# Patient Record
Sex: FEMALE | Race: WHITE | NOT HISPANIC OR LATINO | Employment: FULL TIME | URBAN - METROPOLITAN AREA
[De-identification: names, ages, dates, MRNs, and addresses within clinical notes are randomized per-mention and may not be internally consistent; named-entity substitution may affect disease eponyms.]

---

## 2018-01-09 ENCOUNTER — HISTORICAL (OUTPATIENT)
Dept: SURGERY | Facility: HOSPITAL | Age: 53
End: 2018-01-09

## 2018-01-09 LAB
ABS NEUT (OLG): 5.58 X10(3)/MCL (ref 2.1–9.2)
BASOPHILS # BLD AUTO: 0.02 X10(3)/MCL (ref 0–0.2)
BASOPHILS NFR BLD AUTO: 0.3 % (ref 0–1)
EOSINOPHIL # BLD AUTO: 0.13 X10(3)/MCL (ref 0–0.9)
EOSINOPHIL NFR BLD AUTO: 1.6 % (ref 0–6.4)
ERYTHROCYTE [DISTWIDTH] IN BLOOD BY AUTOMATED COUNT: 15 % (ref 11.5–17)
HCT VFR BLD AUTO: 40.5 % (ref 37–47)
HGB BLD-MCNC: 13.2 GM/DL (ref 12–16)
IMM GRANULOCYTES # BLD AUTO: 0.02 10*3/UL (ref 0–0.02)
IMM GRANULOCYTES NFR BLD AUTO: 0.3 % (ref 0–0.43)
LYMPHOCYTES # BLD AUTO: 1.58 X10(3)/MCL (ref 0.6–4.6)
LYMPHOCYTES NFR BLD AUTO: 20.1 % (ref 16–44)
MCH RBC QN AUTO: 27 PG (ref 27–31)
MCHC RBC AUTO-ENTMCNC: 32.6 GM/DL (ref 33–36)
MCV RBC AUTO: 83 FL (ref 80–94)
MONOCYTES # BLD AUTO: 0.55 X10(3)/MCL (ref 0.1–1.3)
MONOCYTES NFR BLD AUTO: 7 % (ref 4–12.1)
NEUTROPHILS # BLD AUTO: 5.58 X10(3)/MCL (ref 2.1–9.2)
NEUTROPHILS NFR BLD AUTO: 70.7 % (ref 43–73)
NRBC BLD AUTO-RTO: 0 % (ref 0–0.2)
PLATELET # BLD AUTO: 154 X10(3)/MCL (ref 130–400)
PMV BLD AUTO: 10.9 FL (ref 7.4–10.4)
RBC # BLD AUTO: 4.88 X10(6)/MCL (ref 4.2–5.4)
WBC # SPEC AUTO: 7.9 X10(3)/MCL (ref 4.5–11.5)

## 2021-02-19 PROBLEM — E66.9 CLASS 1 OBESITY IN ADULT: Status: ACTIVE | Noted: 2021-02-19

## 2021-02-19 PROBLEM — M54.12 CERVICAL RADICULOPATHY: Status: ACTIVE | Noted: 2021-02-19

## 2021-03-19 PROBLEM — E55.9 VITAMIN D DEFICIENCY: Status: ACTIVE | Noted: 2021-03-19

## 2021-03-19 PROBLEM — I10 HYPERTENSION: Status: ACTIVE | Noted: 2021-03-19

## 2021-03-19 PROBLEM — E63.0 ESSENTIAL FATTY ACID (EFA) DEFICIENCY: Status: ACTIVE | Noted: 2021-03-19

## 2021-03-19 PROBLEM — K75.81 NASH (NONALCOHOLIC STEATOHEPATITIS): Status: ACTIVE | Noted: 2021-03-19

## 2021-03-19 PROBLEM — R73.01 IMPAIRED FASTING GLUCOSE: Status: ACTIVE | Noted: 2021-03-19

## 2021-03-19 PROBLEM — E78.5 HYPERLIPIDEMIA: Status: ACTIVE | Noted: 2021-03-19

## 2021-09-03 PROBLEM — R13.10 DYSPHAGIA: Status: ACTIVE | Noted: 2021-09-03

## 2021-12-01 PROBLEM — Z00.00 WELLNESS EXAMINATION: Status: ACTIVE | Noted: 2021-12-01

## 2021-12-01 PROBLEM — M10.9 GOUT OF LEFT FOOT: Status: ACTIVE | Noted: 2021-12-01

## 2021-12-01 PROBLEM — Z00.00 WELLNESS EXAMINATION: Status: RESOLVED | Noted: 2021-12-01 | Resolved: 2021-12-01

## 2022-01-26 PROBLEM — Z01.818 PREOP EXAMINATION: Status: ACTIVE | Noted: 2022-01-26

## 2022-01-26 NOTE — H&P (VIEW-ONLY)
Clinic Note  1/26/2022      Subjective:       Patient ID:  Tamra is a 56 y.o. female being seen for a clinic visit.      Chief Complaint: Follow-up and Pre-op Exam    Patient presents to the clinic today for follow up/review and surgery clearance.   Patient scheduled for right spur sx with Dr Khalil on Feb 18, 2022      Family History   Problem Relation Age of Onset    Coronary artery disease Mother     Hyperlipidemia Mother     Dementia Mother     Hypertension Mother     Hyperlipidemia Father     Basal cell carcinoma Father     Acute myelogenous leukemia Father     Depression Father     Diabetes Father     Hypertension Father     Sleep apnea Father     Heart attack Father      Social History     Tobacco Use    Smoking status: Never Smoker    Smokeless tobacco: Never Used   Substance and Sexual Activity    Alcohol use: Never    Drug use: Never     Past Surgical History:   Procedure Laterality Date    ACHILLES TENDON SURGERY  01/09/2018 & 11/7/2017    repair torn/ detached to left foot    COLONOSCOPY  2014    CYSTOSCOPY  2015    ESOPHAGOGASTRODUODENOSCOPY  08/2015    LAPAROSCOPIC GASTRIC BANDING  09/2015    PARTIAL PENECTOMY  12/2015    RADICAL HYSTERECTOMY  07/2015    TRIGGER FINGER RELEASE  08/2015     Medication List with Changes/Refills   Current Medications    ALPRAZOLAM (XANAX) 0.25 MG TABLET    Take 1 tablet (0.25 mg total) by mouth nightly as needed for Anxiety.    HYDROCHLOROTHIAZIDE (MICROZIDE) 12.5 MG CAPSULE    Take 12.5 mg by mouth once daily.   Discontinued Medications    COLCHICINE (COLCRYS) 0.6 MG TABLET    Take 1 tablet (0.6 mg total) by mouth once daily.    INDOMETHACIN (INDOCIN) 25 MG CAPSULE    Take 1 capsule (25 mg total) by mouth 3 (three) times daily.     Patient Active Problem List   Diagnosis    Cervical radiculopathy    Class 1 obesity in adult    Essential fatty acid (EFA) deficiency    Hypertension    Hyperlipidemia    Vitamin D deficiency    RUIZ  "(nonalcoholic steatohepatitis)    Impaired fasting glucose    Dysphagia    Gout of left foot    Preop examination     Review of Systems   Constitutional: Negative for chills and fever.   HENT: Negative for ear pain and sore throat.    Eyes: Negative for blurred vision and double vision.   Respiratory: Negative for cough and wheezing.    Cardiovascular: Negative for chest pain and palpitations.   Gastrointestinal: Negative for abdominal pain, constipation and diarrhea.   Genitourinary: Negative for dysuria.   Musculoskeletal: Positive for joint pain. Negative for myalgias.   Skin: Negative for rash.   Neurological: Negative for weakness and headaches.   Endo/Heme/Allergies: Does not bruise/bleed easily.   Psychiatric/Behavioral: Negative for depression and suicidal ideas.   All other systems reviewed and are negative.        Objective:      /82   Pulse 73   Resp 18   Ht 5' 6" (1.676 m)   Wt 93.9 kg (207 lb)   SpO2 98%   BMI 33.41 kg/m²   Estimated body mass index is 33.41 kg/m² as calculated from the following:    Height as of this encounter: 5' 6" (1.676 m).    Weight as of this encounter: 93.9 kg (207 lb).     Physical Exam  Vitals reviewed.   Constitutional:       Appearance: Normal appearance. She is obese.   HENT:      Head: Normocephalic and atraumatic.      Nose: Nose normal.      Mouth/Throat:      Pharynx: Oropharynx is clear.   Eyes:      Extraocular Movements: Extraocular movements intact.      Conjunctiva/sclera: Conjunctivae normal.      Pupils: Pupils are equal, round, and reactive to light.   Cardiovascular:      Rate and Rhythm: Normal rate and regular rhythm.      Pulses: Normal pulses.   Pulmonary:      Effort: Pulmonary effort is normal.      Breath sounds: Normal breath sounds.   Abdominal:      General: Abdomen is flat. Bowel sounds are normal.      Palpations: Abdomen is soft.   Musculoskeletal:         General: Normal range of motion.      Cervical back: Normal range of motion " and neck supple.   Skin:     General: Skin is warm and dry.   Neurological:      General: No focal deficit present.      Mental Status: She is alert and oriented to person, place, and time. Mental status is at baseline.   Psychiatric:         Mood and Affect: Mood normal.         Behavior: Behavior normal.            Assessment and Plan:           Problem List Items Addressed This Visit        Cardiac/Vascular    Hypertension    Current Assessment & Plan         BP Readings from Last 3 Encounters:   01/26/22 128/82   12/01/21 130/78   09/03/21 126/74     Likely secondary to indomethacin use will discontinue nonsteroidal anti-inflammatory drugs then reevaluate. Will start HCTZ 12.5 mg po q am,          Hyperlipidemia    Current Assessment & Plan      as of 9/11/2020,  as of 3/2021.   Lab Results   Component Value Date    CHOL 265 (H) 11/10/2021     Lab Results   Component Value Date    HDL 52 11/10/2021     Lab Results   Component Value Date    LDLCALC 185 (H) 11/10/2021     Lab Results   Component Value Date    TRIG 152 (H) 11/10/2021     No results found for: CHOLHDL  Recommend lifestyle modifications with diet and exercise.             Endocrine    Class 1 obesity in adult    Current Assessment & Plan     Obesity: Stay Active. As tolerated alternate resistance training with stretching and cardio. Goal of 150 minutes per week or 7,500 - 10,000 steps per day. Follow the Mediterranean Diet. Include fruit, vegetables, olive oil, seeds, nuts, whole grains, cold water fish and lean cuts of meat.  Do not drink beverages with any caloric content.  Decrease portion sizes slightly which will result in an approximately 500-calorie deficit.  Consider substituting one meal a day with a meal replacement such as Slim fast, lean cuisine, or weight watcher's.  Avoid fast or fried food.    Obesity: lap band in 2008.  75 pound weight loss total.  Failed adipex in past (palpitations).  Failed Qsymia (didnt feel right).   Failed Belviq due to headache and fatigue. Will consider contrave in future. began Contrave 11/18/2020. but stopped suddenly due to side effects (generalized malaise/fatigue)    12/1/2021  Wt Readings from Last 3 Encounters:   01/26/22 1114 93.9 kg (207 lb)   12/01/21 0810 97.1 kg (214 lb)   09/03/21 0904 98.9 kg (218 lb)              Impaired fasting glucose    Current Assessment & Plan     Lab Results   Component Value Date    HGBA1C 5.4 11/10/2021   FBS of 112. Continue weight reduction and dietary modifications.               GI    RUIZ (nonalcoholic steatohepatitis)    Current Assessment & Plan     Nonalcoholic steatohepatitis: Recommend a 5-10% body weight reduction.  Advised strict limitation of saturated fat and trans-fat consumption.  Recommend daily exercise for 30 minutes daily.  Recommend a very low calorie diet of 500 jason per day until inflammation resolves.    Lab Results   Component Value Date    ALT 74 (H) 11/10/2021    AST 47 (H) 11/10/2021    BILITOT 0.5 11/10/2021                 Orthopedic    Gout of left foot    Current Assessment & Plan     12/1/2021 Left foot, reports telemed visit on Fri 11/26, taking colchicine w/ some improvement. Patient was only provided 2 days of colchicine.   Will send in colchicine 0.6mg daily until pain resolved and indomethacin.             Other    Preop examination - Primary    Current Assessment & Plan     Perioperative cardiovascular risk assessment:  Patient is low cardiovascular risk.  Procedure: Right heel achilles tendon detachment/debridement.   Procedure risk: Low   Requested labs will be ordered if necessary.    Medication recommendations as follows:  - hold all medications in perioperative setting.     May proceed to surgery without further risk stratification.                 Relevant Orders    Basic metabolic panel    CBC W/ AUTO DIFFERENTIAL    Protime-INR    APTT    Uric acid          Follow up:   Follow up in about 6 months (around 7/26/2022), or  Labs:  follow up with A1c, urine microalbumin, TSH/Free T4 (5 months) Imaging: [facility] (6 months), for lab review .     Other Orders Placed This Visit:  Orders Placed This Encounter   Procedures    Basic metabolic panel     Standing Status:   Future     Standing Expiration Date:   3/27/2023    CBC W/ AUTO DIFFERENTIAL     Standing Status:   Future     Standing Expiration Date:   3/27/2023    Protime-INR     Standing Status:   Future     Standing Expiration Date:   3/27/2023    APTT     Standing Status:   Future     Standing Expiration Date:   3/27/2023    Uric acid     Standing Status:   Future     Standing Expiration Date:   3/27/2023           Rosalia Payne

## 2022-02-07 ENCOUNTER — LAB VISIT (OUTPATIENT)
Dept: LAB | Facility: HOSPITAL | Age: 57
End: 2022-02-07
Attending: INTERNAL MEDICINE
Payer: COMMERCIAL

## 2022-02-07 DIAGNOSIS — Z01.818 PREOP EXAMINATION: ICD-10-CM

## 2022-02-07 LAB
ANION GAP SERPL CALC-SCNC: 3 MMOL/L (ref 8–16)
APTT BLDCRRT: 24.4 SEC (ref 21–32)
BASOPHILS # BLD AUTO: 0.03 K/UL (ref 0–0.2)
BASOPHILS NFR BLD: 0.5 % (ref 0–1.9)
BUN SERPL-MCNC: 20 MG/DL (ref 6–20)
CALCIUM SERPL-MCNC: 9.1 MG/DL (ref 8.7–10.5)
CHLORIDE SERPL-SCNC: 112 MMOL/L (ref 95–110)
CO2 SERPL-SCNC: 28 MMOL/L (ref 23–29)
CREAT SERPL-MCNC: 1 MG/DL (ref 0.5–1.4)
DIFFERENTIAL METHOD: NORMAL
EOSINOPHIL # BLD AUTO: 0.2 K/UL (ref 0–0.5)
EOSINOPHIL NFR BLD: 3.9 % (ref 0–8)
ERYTHROCYTE [DISTWIDTH] IN BLOOD BY AUTOMATED COUNT: 13.1 % (ref 11.5–14.5)
EST. GFR  (AFRICAN AMERICAN): >60 ML/MIN/1.73 M^2
EST. GFR  (NON AFRICAN AMERICAN): >60 ML/MIN/1.73 M^2
GLUCOSE SERPL-MCNC: 97 MG/DL (ref 70–110)
HCT VFR BLD AUTO: 42.1 % (ref 37–48.5)
HGB BLD-MCNC: 14 G/DL (ref 12–16)
IMM GRANULOCYTES # BLD AUTO: 0.03 K/UL (ref 0–0.04)
IMM GRANULOCYTES NFR BLD AUTO: 0.5 % (ref 0–0.5)
INR PPP: 1 (ref 0.8–1.2)
LYMPHOCYTES # BLD AUTO: 1.9 K/UL (ref 1–4.8)
LYMPHOCYTES NFR BLD: 30.8 % (ref 18–48)
MCH RBC QN AUTO: 28.2 PG (ref 27–31)
MCHC RBC AUTO-ENTMCNC: 33.3 G/DL (ref 32–36)
MCV RBC AUTO: 85 FL (ref 82–98)
MONOCYTES # BLD AUTO: 0.5 K/UL (ref 0.3–1)
MONOCYTES NFR BLD: 7.8 % (ref 4–15)
NEUTROPHILS # BLD AUTO: 3.5 K/UL (ref 1.8–7.7)
NEUTROPHILS NFR BLD: 56.5 % (ref 38–73)
NRBC BLD-RTO: 0 /100 WBC
PLATELET # BLD AUTO: 167 K/UL (ref 150–450)
PMV BLD AUTO: 10.8 FL (ref 9.2–12.9)
POTASSIUM SERPL-SCNC: 4.7 MMOL/L (ref 3.5–5.1)
PROTHROMBIN TIME: 10.6 SEC (ref 9–12.5)
RBC # BLD AUTO: 4.97 M/UL (ref 4–5.4)
SODIUM SERPL-SCNC: 143 MMOL/L (ref 136–145)
URATE SERPL-MCNC: 8 MG/DL (ref 2.4–5.7)
WBC # BLD AUTO: 6.16 K/UL (ref 3.9–12.7)

## 2022-02-07 PROCEDURE — 85610 PROTHROMBIN TIME: CPT | Performed by: FAMILY MEDICINE

## 2022-02-07 PROCEDURE — 84550 ASSAY OF BLOOD/URIC ACID: CPT | Performed by: FAMILY MEDICINE

## 2022-02-07 PROCEDURE — 85025 COMPLETE CBC W/AUTO DIFF WBC: CPT | Performed by: FAMILY MEDICINE

## 2022-02-07 PROCEDURE — 80048 BASIC METABOLIC PNL TOTAL CA: CPT | Performed by: FAMILY MEDICINE

## 2022-02-07 PROCEDURE — 36415 COLL VENOUS BLD VENIPUNCTURE: CPT | Performed by: FAMILY MEDICINE

## 2022-02-07 PROCEDURE — 85730 THROMBOPLASTIN TIME PARTIAL: CPT | Performed by: FAMILY MEDICINE

## 2022-02-09 NOTE — DISCHARGE INSTRUCTIONS
BEFORE THE PROCEDURE:    REPORT ANY CHANGE IN YOUR PHYSICAL CONDITION TO YOUR DOCTOR IMMEDIATELY.  SELF ISOLATE AND CHECK TEMPERATURE DAILY, IF TEMP OVER 100, CALL PHYSICIAN IMMEDIATELY.  TRY TO REFRAIN FROM SMOKING AND ALCOHOL 72 HOURS BEFORE YOUR PROCEDURE.   DO NOT EAT OR DRINK ANYTHING AFTER MIDNIGHT THE NIGHT BEFORE YOUR PROCEDURE.  NO MAKE UP, NAIL POLISH OR JEWELRY.      SURGERY PREP    SOMEONE WILL CALL YOU THE DAY BEFORE YOUR PROCEDURE WITH A CHECK-IN TIME FOR YOUR PROCEDURE.    DAY OF YOUR PROCEDURE:    TAKE BLOOD PRESSURE MEDICATIONS THE MORNING OF YOUR PROCEDURE, WITH SMALL SIPS WATER, AS DIRECTED BY YOUR PHYSICIAN.   DO NOT TAKE ANY DIABETIC MEDICATIONS UNLESS DIRECTED TO DO SO BY YOUR PHYSICIAN.   CONTACT LENSES AND DENTURES MUST BE REMOVED.  A RESPONSIBLE ADULT MUST ACCOMPANY YOU HOME UPON DISCHARGE.   ONLY 1 VISITOR ALLOWED PER ROOM.     COVID TESTING Thursday February 17, 2022    YOUR THOUGHTS AND OPINIONS HELP US TO BETTER SERVE YOU.     PLEASE PARTICIPATE IN SURVEYS ABOUT YOUR CARE.    THANK YOU FOR CHOOSING [facility].

## 2022-02-10 ENCOUNTER — HOSPITAL ENCOUNTER (OUTPATIENT)
Dept: PREADMISSION TESTING | Facility: HOSPITAL | Age: 57
Discharge: HOME | End: 2022-02-10
Attending: PODIATRIST
Payer: COMMERCIAL

## 2022-02-10 NOTE — DISCHARGE INSTRUCTIONS
BEFORE THE PROCEDURE:    REPORT ANY CHANGE IN YOUR PHYSICAL CONDITION TO YOUR DOCTOR IMMEDIATELY.  SELF ISOLATE AND CHECK TEMPERATURE DAILY, IF TEMP OVER 100, CALL PHYSICIAN IMMEDIATELY.  TRY TO REFRAIN FROM SMOKING AND ALCOHOL 72 HOURS BEFORE YOUR PROCEDURE.   DO NOT EAT OR DRINK ANYTHING AFTER MIDNIGHT THE NIGHT BEFORE YOUR PROCEDURE.  NO MAKE UP, NAIL POLISH OR JEWELRY.      SURGERY PREP    SOMEONE WILL CALL YOU THE DAY BEFORE YOUR PROCEDURE WITH A CHECK-IN TIME FOR YOUR PROCEDURE.    DAY OF YOUR PROCEDURE:    TAKE BLOOD PRESSURE MEDICATIONS THE MORNING OF YOUR PROCEDURE, WITH SMALL SIPS WATER, AS DIRECTED BY YOUR PHYSICIAN.   DO NOT TAKE ANY DIABETIC MEDICATIONS UNLESS DIRECTED TO DO SO BY YOUR PHYSICIAN.   CONTACT LENSES AND DENTURES MUST BE REMOVED.  A RESPONSIBLE ADULT MUST ACCOMPANY YOU HOME UPON DISCHARGE.   ONLY 1 VISITOR ALLOWED PER ROOM.     COVID TESTING Thursday February 17, 2022  (8-12)    YOUR THOUGHTS AND OPINIONS HELP US TO BETTER SERVE YOU.     PLEASE PARTICIPATE IN SURVEYS ABOUT YOUR CARE.    THANK YOU FOR CHOOSING [facility].

## 2022-02-14 ENCOUNTER — HOSPITAL ENCOUNTER (OUTPATIENT)
Dept: PULMONOLOGY | Facility: HOSPITAL | Age: 57
Discharge: HOME | End: 2022-02-14
Attending: PODIATRIST
Payer: COMMERCIAL

## 2022-02-14 ENCOUNTER — HOSPITAL ENCOUNTER (OUTPATIENT)
Dept: RADIOLOGY | Facility: HOSPITAL | Age: 57
Discharge: HOME | End: 2022-02-14
Attending: PODIATRIST
Payer: COMMERCIAL

## 2022-02-14 ENCOUNTER — HOSPITAL ENCOUNTER (OUTPATIENT)
Dept: PREADMISSION TESTING | Facility: HOSPITAL | Age: 57
Discharge: HOME | End: 2022-02-14
Attending: PODIATRIST
Payer: COMMERCIAL

## 2022-02-14 VITALS — BODY MASS INDEX: 32.78 KG/M2 | WEIGHT: 204 LBS | HEIGHT: 66 IN

## 2022-02-14 DIAGNOSIS — Z01.818 PRE-OP EVALUATION: ICD-10-CM

## 2022-02-14 DIAGNOSIS — Z01.818 PRE-OP EVALUATION: Primary | ICD-10-CM

## 2022-02-14 DIAGNOSIS — Z01.818 PRE-OP TESTING: ICD-10-CM

## 2022-02-14 PROCEDURE — 93010 ELECTROCARDIOGRAM REPORT: CPT | Mod: ,,, | Performed by: INTERNAL MEDICINE

## 2022-02-14 PROCEDURE — 93005 ELECTROCARDIOGRAM TRACING: CPT

## 2022-02-14 PROCEDURE — 71046 X-RAY EXAM CHEST 2 VIEWS: CPT | Mod: TC

## 2022-02-14 PROCEDURE — 93010 EKG 12-LEAD: ICD-10-PCS | Mod: ,,, | Performed by: INTERNAL MEDICINE

## 2022-02-16 ENCOUNTER — ANESTHESIA EVENT (OUTPATIENT)
Dept: SURGERY | Facility: HOSPITAL | Age: 57
End: 2022-02-16
Payer: COMMERCIAL

## 2022-02-16 NOTE — ANESTHESIA PREPROCEDURE EVALUATION
02/16/2022  Tamra Brock is a 56 y.o., female.    Anesthesia Evaluation          Review of Systems  Anesthesia Hx:  History of prior surgery of interest to airway management or planning:   Social:  Non-Smoker   Cardiovascular:   Hypertension, well controlled ECG has been reviewed. ABLE CLEARANCE   Musculoskeletal:   GOUT   Neurological:   Neuromuscular Disease, CERVICAL RADICULOPATHY     Lab Results   Component Value Date    WBC 6.16 02/07/2022    HGB 14.0 02/07/2022    HCT 42.1 02/07/2022    MCV 85 02/07/2022     02/07/2022     CMP  Sodium   Date Value Ref Range Status   02/07/2022 143 136 - 145 mmol/L Final     Potassium   Date Value Ref Range Status   02/07/2022 4.7 3.5 - 5.1 mmol/L Final     Chloride   Date Value Ref Range Status   02/07/2022 112 (H) 95 - 110 mmol/L Final     CO2   Date Value Ref Range Status   02/07/2022 28 23 - 29 mmol/L Final     Glucose   Date Value Ref Range Status   02/07/2022 97 70 - 110 mg/dL Final     BUN   Date Value Ref Range Status   02/07/2022 20 6 - 20 mg/dL Final     Creatinine   Date Value Ref Range Status   02/07/2022 1.0 0.5 - 1.4 mg/dL Final     Calcium   Date Value Ref Range Status   02/07/2022 9.1 8.7 - 10.5 mg/dL Final     Albumin   Date Value Ref Range Status   11/10/2021 4.7 3.8 - 4.9 g/dL Final     Total Bilirubin   Date Value Ref Range Status   11/10/2021 0.5 0.0 - 1.2 mg/dL Final     AST   Date Value Ref Range Status   11/10/2021 47 (H) 0 - 40 IU/L Final     ALT   Date Value Ref Range Status   11/10/2021 74 (H) 0 - 32 IU/L Final     Anion Gap   Date Value Ref Range Status   02/07/2022 3 (L) 8 - 16 mmol/L Final     eGFR if    Date Value Ref Range Status   02/07/2022 >60.0 >60 mL/min/1.73 m^2 Final     eGFR if non    Date Value Ref Range Status   02/07/2022 >60.0 >60 mL/min/1.73 m^2 Final     Comment:     Calculation used  to obtain the estimated glomerular filtration  rate (eGFR) is the CKD-EPI equation.          Physical Exam  General:  Well nourished    Airway/Jaw/Neck:  Airway Findings: Mouth Opening: Normal Tongue: Normal  General Airway Assessment: Adult  Mallampati: III  Improves to III with phonation.  TM Distance: Normal, at least 6 cm  Jaw/Neck Findings:  Neck ROM: Normal ROM      Dental:  Dental Findings: In tact   Chest/Lungs:  Chest/Lungs Findings: Clear to auscultation, Normal Respiratory Rate     Heart/Vascular:  Heart Findings: Rate: Normal  Rhythm: Regular Rhythm  Sounds: Normal        Mental Status:  Mental Status Findings:  Cooperative         Anesthesia Plan  Type of Anesthesia, risks & benefits discussed:  Anesthesia Type:  spinal, general    Patient's Preference:   Plan Factors:          Intra-op Monitoring Plan: standard ASA monitors  Intra-op Monitoring Plan Comments:   Post Op Pain Control Plan: multimodal analgesia  Post Op Pain Control Plan Comments:     Induction:    Beta Blocker:  Patient is not currently on a Beta-Blocker (No further documentation required).       Informed Consent: Patient understands risks and agrees with Anesthesia plan.  Questions answered. Anesthesia consent signed with patient.  ASA Score: 3     Day of Surgery Review of History & Physical: I have interviewed and examined the patient. I have reviewed the patient's H&P dated:  There are no significant changes.  H&P update referred to the surgeon.         Ready For Surgery From Anesthesia Perspective.

## 2022-02-17 ENCOUNTER — LAB VISIT (OUTPATIENT)
Dept: LAB | Facility: HOSPITAL | Age: 57
End: 2022-02-17
Attending: PODIATRIST
Payer: COMMERCIAL

## 2022-02-17 DIAGNOSIS — Z01.818 PRE-OP TESTING: ICD-10-CM

## 2022-02-17 LAB — SARS-COV-2 RNA RESP QL NAA+PROBE: NOT DETECTED

## 2022-02-17 PROCEDURE — U0002 COVID-19 LAB TEST NON-CDC: HCPCS | Performed by: PODIATRIST

## 2022-02-18 ENCOUNTER — ANESTHESIA (OUTPATIENT)
Dept: SURGERY | Facility: HOSPITAL | Age: 57
End: 2022-02-18
Payer: COMMERCIAL

## 2022-02-18 ENCOUNTER — HOSPITAL ENCOUNTER (OUTPATIENT)
Facility: HOSPITAL | Age: 57
Discharge: HOME | End: 2022-02-18
Attending: PODIATRIST | Admitting: PODIATRIST
Payer: COMMERCIAL

## 2022-02-18 VITALS
DIASTOLIC BLOOD PRESSURE: 70 MMHG | HEART RATE: 69 BPM | OXYGEN SATURATION: 96 % | TEMPERATURE: 98 F | SYSTOLIC BLOOD PRESSURE: 132 MMHG | RESPIRATION RATE: 20 BRPM

## 2022-02-18 DIAGNOSIS — M77.31 POSTERIOR CALCANEAL EXOSTOSIS, RIGHT: ICD-10-CM

## 2022-02-18 PROCEDURE — 25000003 PHARM REV CODE 250: Performed by: PODIATRIST

## 2022-02-18 PROCEDURE — 36000709 HC OR TIME LEV III EA ADD 15 MIN: Performed by: PODIATRIST

## 2022-02-18 PROCEDURE — 71000016 HC POSTOP RECOV ADDL HR: Performed by: PODIATRIST

## 2022-02-18 PROCEDURE — 63600175 PHARM REV CODE 636 W HCPCS: Performed by: NURSE ANESTHETIST, CERTIFIED REGISTERED

## 2022-02-18 PROCEDURE — 63600175 PHARM REV CODE 636 W HCPCS: Performed by: PODIATRIST

## 2022-02-18 PROCEDURE — C1713 ANCHOR/SCREW BN/BN,TIS/BN: HCPCS | Performed by: PODIATRIST

## 2022-02-18 PROCEDURE — 27201423 OPTIME MED/SURG SUP & DEVICES STERILE SUPPLY: Performed by: PODIATRIST

## 2022-02-18 PROCEDURE — 71000033 HC RECOVERY, INTIAL HOUR: Performed by: PODIATRIST

## 2022-02-18 PROCEDURE — 37000009 HC ANESTHESIA EA ADD 15 MINS: Performed by: PODIATRIST

## 2022-02-18 PROCEDURE — 71000015 HC POSTOP RECOV 1ST HR: Performed by: PODIATRIST

## 2022-02-18 PROCEDURE — 25000003 PHARM REV CODE 250: Performed by: ANESTHESIOLOGY

## 2022-02-18 PROCEDURE — 36000708 HC OR TIME LEV III 1ST 15 MIN: Performed by: PODIATRIST

## 2022-02-18 PROCEDURE — 25000003 PHARM REV CODE 250: Performed by: NURSE ANESTHETIST, CERTIFIED REGISTERED

## 2022-02-18 PROCEDURE — 37000008 HC ANESTHESIA 1ST 15 MINUTES: Performed by: PODIATRIST

## 2022-02-18 DEVICE — SONICANCHOR KIT
Type: IMPLANTABLE DEVICE | Site: ACHILLES TENDON | Status: FUNCTIONAL
Brand: SONICANCHOR

## 2022-02-18 RX ORDER — DIPHENHYDRAMINE HYDROCHLORIDE 50 MG/ML
25 INJECTION INTRAMUSCULAR; INTRAVENOUS EVERY 6 HOURS PRN
Status: DISCONTINUED | OUTPATIENT
Start: 2022-02-18 | End: 2022-02-18 | Stop reason: HOSPADM

## 2022-02-18 RX ORDER — ONDANSETRON 2 MG/ML
4 INJECTION INTRAMUSCULAR; INTRAVENOUS DAILY PRN
Status: DISCONTINUED | OUTPATIENT
Start: 2022-02-18 | End: 2022-02-18 | Stop reason: HOSPADM

## 2022-02-18 RX ORDER — CEFAZOLIN SODIUM 2 G/50ML
2 SOLUTION INTRAVENOUS
Status: COMPLETED | OUTPATIENT
Start: 2022-02-18 | End: 2022-02-18

## 2022-02-18 RX ORDER — HYDROCODONE BITARTRATE AND ACETAMINOPHEN 5; 325 MG/1; MG/1
1 TABLET ORAL EVERY 4 HOURS PRN
Status: DISCONTINUED | OUTPATIENT
Start: 2022-02-18 | End: 2022-02-18 | Stop reason: HOSPADM

## 2022-02-18 RX ORDER — LIDOCAINE HYDROCHLORIDE 10 MG/ML
1 INJECTION, SOLUTION EPIDURAL; INFILTRATION; INTRACAUDAL; PERINEURAL ONCE
Status: DISCONTINUED | OUTPATIENT
Start: 2022-02-18 | End: 2022-07-12

## 2022-02-18 RX ORDER — MIDAZOLAM HYDROCHLORIDE 1 MG/ML
INJECTION INTRAMUSCULAR; INTRAVENOUS
Status: DISCONTINUED | OUTPATIENT
Start: 2022-02-18 | End: 2022-02-18

## 2022-02-18 RX ORDER — SODIUM CHLORIDE 9 MG/ML
INJECTION, SOLUTION INTRAVENOUS CONTINUOUS
Status: DISCONTINUED | OUTPATIENT
Start: 2022-02-18 | End: 2022-02-18 | Stop reason: HOSPADM

## 2022-02-18 RX ORDER — BUPIVACAINE HYDROCHLORIDE 7.5 MG/ML
INJECTION, SOLUTION EPIDURAL; RETROBULBAR
Status: COMPLETED | OUTPATIENT
Start: 2022-02-18 | End: 2022-02-18

## 2022-02-18 RX ORDER — IBUPROFEN 600 MG/1
600 TABLET ORAL EVERY 6 HOURS PRN
Status: DISCONTINUED | OUTPATIENT
Start: 2022-02-18 | End: 2022-02-18 | Stop reason: HOSPADM

## 2022-02-18 RX ORDER — HYDROMORPHONE HYDROCHLORIDE 1 MG/ML
0.5 INJECTION, SOLUTION INTRAMUSCULAR; INTRAVENOUS; SUBCUTANEOUS EVERY 5 MIN PRN
Status: DISCONTINUED | OUTPATIENT
Start: 2022-02-18 | End: 2022-02-18 | Stop reason: HOSPADM

## 2022-02-18 RX ORDER — ONDANSETRON 4 MG/1
8 TABLET, ORALLY DISINTEGRATING ORAL EVERY 8 HOURS PRN
Status: DISCONTINUED | OUTPATIENT
Start: 2022-02-18 | End: 2022-02-18 | Stop reason: HOSPADM

## 2022-02-18 RX ORDER — MORPHINE SULFATE 4 MG/ML
4 INJECTION, SOLUTION INTRAMUSCULAR; INTRAVENOUS EVERY 5 MIN PRN
Status: DISCONTINUED | OUTPATIENT
Start: 2022-02-18 | End: 2022-02-18 | Stop reason: HOSPADM

## 2022-02-18 RX ORDER — DEXAMETHASONE SODIUM PHOSPHATE 4 MG/ML
INJECTION, SOLUTION INTRA-ARTICULAR; INTRALESIONAL; INTRAMUSCULAR; INTRAVENOUS; SOFT TISSUE
Status: DISCONTINUED | OUTPATIENT
Start: 2022-02-18 | End: 2022-02-18

## 2022-02-18 RX ORDER — FENTANYL CITRATE 50 UG/ML
INJECTION, SOLUTION INTRAMUSCULAR; INTRAVENOUS
Status: DISCONTINUED | OUTPATIENT
Start: 2022-02-18 | End: 2022-02-18

## 2022-02-18 RX ORDER — ONDANSETRON 2 MG/ML
INJECTION INTRAMUSCULAR; INTRAVENOUS
Status: DISCONTINUED | OUTPATIENT
Start: 2022-02-18 | End: 2022-02-18

## 2022-02-18 RX ORDER — HYDROCODONE BITARTRATE AND ACETAMINOPHEN 10; 325 MG/1; MG/1
1 TABLET ORAL EVERY 4 HOURS PRN
Status: DISCONTINUED | OUTPATIENT
Start: 2022-02-18 | End: 2022-02-18 | Stop reason: HOSPADM

## 2022-02-18 RX ORDER — BUPIVACAINE HYDROCHLORIDE 5 MG/ML
INJECTION, SOLUTION EPIDURAL; INTRACAUDAL
Status: DISCONTINUED | OUTPATIENT
Start: 2022-02-18 | End: 2022-02-18 | Stop reason: HOSPADM

## 2022-02-18 RX ORDER — SODIUM CHLORIDE 0.9 % (FLUSH) 0.9 %
10 SYRINGE (ML) INJECTION
Status: DISCONTINUED | OUTPATIENT
Start: 2022-02-18 | End: 2022-07-12

## 2022-02-18 RX ORDER — SODIUM CHLORIDE 9 MG/ML
INJECTION, SOLUTION INTRAVENOUS CONTINUOUS
Status: DISCONTINUED | OUTPATIENT
Start: 2022-02-18 | End: 2022-07-12

## 2022-02-18 RX ORDER — HYDROCODONE BITARTRATE AND ACETAMINOPHEN 7.5; 325 MG/1; MG/1
1 TABLET ORAL ONCE
Status: COMPLETED | OUTPATIENT
Start: 2022-02-18 | End: 2022-02-18

## 2022-02-18 RX ADMIN — MIDAZOLAM 1 MG: 1 INJECTION INTRAMUSCULAR; INTRAVENOUS at 09:02

## 2022-02-18 RX ADMIN — CEFAZOLIN SODIUM 2 G: 2 SOLUTION INTRAVENOUS at 08:02

## 2022-02-18 RX ADMIN — DEXAMETHASONE SODIUM PHOSPHATE 4 MG: 4 INJECTION, SOLUTION INTRA-ARTICULAR; INTRALESIONAL; INTRAMUSCULAR; INTRAVENOUS; SOFT TISSUE at 08:02

## 2022-02-18 RX ADMIN — HYDROCODONE BITARTRATE AND ACETAMINOPHEN 1 TABLET: 7.5; 325 TABLET ORAL at 08:02

## 2022-02-18 RX ADMIN — MIDAZOLAM 2 MG: 1 INJECTION INTRAMUSCULAR; INTRAVENOUS at 08:02

## 2022-02-18 RX ADMIN — MIDAZOLAM 3 MG: 1 INJECTION INTRAMUSCULAR; INTRAVENOUS at 08:02

## 2022-02-18 RX ADMIN — FENTANYL CITRATE 80 MCG: 50 INJECTION INTRAMUSCULAR; INTRAVENOUS at 09:02

## 2022-02-18 RX ADMIN — MIDAZOLAM 2 MG: 1 INJECTION INTRAMUSCULAR; INTRAVENOUS at 09:02

## 2022-02-18 RX ADMIN — SODIUM CHLORIDE: 0.9 INJECTION, SOLUTION INTRAVENOUS at 07:02

## 2022-02-18 RX ADMIN — ONDANSETRON 4 MG: 2 INJECTION INTRAMUSCULAR; INTRAVENOUS at 08:02

## 2022-02-18 RX ADMIN — FENTANYL CITRATE 20 MCG: 50 INJECTION INTRAMUSCULAR; INTRAVENOUS at 09:02

## 2022-02-18 RX ADMIN — BUPIVACAINE HYDROCHLORIDE 1.6 ML: 7.5 INJECTION, SOLUTION EPIDURAL; RETROBULBAR at 09:02

## 2022-02-18 NOTE — BRIEF OP NOTE
[facility] - Surgery  Brief Operative Note    Surgery Date: 2/18/2022     Surgeon(s) and Role:     * Rick Khalil DPM - Primary     * Jaime Heck DPM - Assisting        Pre-op Diagnosis:  Consent says: posterior calcaneal exostectomy rt foot with achilles tendon detachment/debridement    Post-op Diagnosis:  Post-Op Diagnosis Codes:     * Exostosis of right posterior calcaneus [M77.31]     * Tendonitis, Achilles, right [M76.61]    Procedure(s) (LRB):  EXCISION, EXOSTOSIS, RETROCALCANEAL (Right)  REPAIR, TENDON, ACHILLES (Right)    Anesthesia: Spinal with local 10ml 0.5% marcaine plain    Operative Findings: Large calcaneal exostosis with gouty tophi in Achilles insertion area    Estimated Blood Loss: Less than 5ml         Specimens:   Specimen (24h ago, onward)            None            Discharge Note    OUTCOME: Patient tolerated treatment/procedure well without complication and is now ready for discharge.    DISPOSITION: Home or Self Care    FINAL DIAGNOSIS:  <principal problem not specified>    FOLLOWUP: In clinic    DISCHARGE INSTRUCTIONS:    Discharge Procedure Orders   Diet general     Keep surgical extremity elevated     Ice to affected area     Leave dressing on - Keep it clean, dry, and intact until clinic visit     Weight bearing restrictions (specify)

## 2022-02-18 NOTE — DISCHARGE INSTRUCTIONS
Leave dressing clean dry and on unil Seen by dr wong      Elevate  Right leg  Ice under r  Knee     Notify dr wong of any problems or concerns   Non  Weight bearing to r  Leg

## 2022-02-18 NOTE — ANESTHESIA PROCEDURE NOTES
Spinal    Diagnosis: Surgery  Patient location during procedure: OR  Start time: 2/18/2022 9:59 AM  Timeout: 2/18/2022 8:58 AM  End time: 2/18/2022 9:03 AM    Staffing  Authorizing Provider: Erwin Austin CRNA  Performing Provider: Erwin Austin CRNA    Preanesthetic Checklist  Completed: patient identified, IV checked, site marked, risks and benefits discussed, surgical consent, monitors and equipment checked, pre-op evaluation and timeout performed  Spinal Block  Patient position: sitting  Prep: Betadine  Patient monitoring: heart rate, cardiac monitor, frequent blood pressure checks and continuous pulse ox  Approach: midline  Location: L3-4  Injection technique: lumbar puncture  CSF Fluid: clear free-flowing CSF  Needle  Needle type: pencil-tip   Needle gauge: 25 G  Needle length: 3.5 in  Additional Documentation: negative aspiration for heme and no paresthesia on injection  Needle localization: anatomical landmarks  Assessment  Sensory level: T4   Dermatomal levels determined by pinch or prick  Ease of block: easy  Patient's tolerance of the procedure: comfortable throughout block and no complaints  Medications:  Medication Administration Time: 2/18/2022 9:02 AM  Medications: bupivacaine (pf) (MARCAINE) injection 0.75%, 1.6 mL

## 2022-02-18 NOTE — ANESTHESIA POSTPROCEDURE EVALUATION
Anesthesia Post Evaluation    Patient: Tamra Brock    Procedure(s) Performed: Procedure(s) (LRB):  EXCISION, EXOSTOSIS, RETROCALCANEAL (Right)  REPAIR, TENDON, ACHILLES (Right)    Final Anesthesia Type: spinal      Patient location during evaluation: PACU  Patient participation: Yes- Able to Participate  Level of consciousness: awake  Post-procedure vital signs: reviewed and stable  Pain management: adequate  Airway patency: patent    PONV status at discharge: No PONV  Anesthetic complications: no      Cardiovascular status: blood pressure returned to baseline  Respiratory status: spontaneous ventilation  Hydration status: euvolemic  Follow-up not needed.          Vitals Value Taken Time   /57 02/18/22 1159   Temp 36.7 °C (98 °F) 02/18/22 1159   Pulse 79 02/18/22 1159   Resp 20 02/18/22 1159   SpO2 94 % 02/18/22 1159         Event Time   Out of Recovery 11:04:00         Pain/Jin Score: Pain Rating Prior to Med Admin: 10 (2/18/2022  8:01 AM)  Jin Score: 10 (2/18/2022 10:57 AM)

## 2022-02-18 NOTE — TRANSFER OF CARE
Anesthesia Transfer of Care Note    Patient: Tamra Brock    Procedure(s) Performed: Procedure(s) (LRB):  EXCISION, EXOSTOSIS, RETROCALCANEAL (Right)  REPAIR, TENDON, ACHILLES (Right)    Patient location: PACU    Anesthesia Type: spinal    Transport from OR: Transported from OR on room air with adequate spontaneous ventilation    Post pain: adequate analgesia    Post assessment: no apparent anesthetic complications    Post vital signs: stable    Level of consciousness: awake    Nausea/Vomiting: no nausea/vomiting    Complications: none    Transfer of care protocol was followed      Last vitals:   145/65  16 RR  75 HR  99% O2 SAT  37 C TEMP

## 2022-02-18 NOTE — OP NOTE
Date of Procedure: 02/18/22     Surgeon:  RIVER Khalil DPM     Preop diagnosis: 1.  Retrocalcaneal exostosis, right                               2.  Achilles tendinitis, right foot      Postoperative diagnosis: 1.  Retrocalcaneal exostosis, right                                            2.  Achilles tendinitis, right foot      Procedure performed:  1.  Retrocalcaneal exostectomy, right                                         2.  Debridement of Achilles tendon with reattachment with sonic anchor     Anesthesia:  Spinal with local 10 mL of 0.5% Marcaine     Hemostasis:  Well-padded pneumatic tourniquet set to 250mmHg to right thigh for 50 minutes     Materials:  Sonic anchor with #2 fiberwire, 3.0 Vicryl, 4.0 Nylon     Estimated blood loss:  Less than 5ml     Findings:  Large posterior calcaneal exostosis with thickned Achilles tendon and gouty tophi at insertion     Complications:  None     Indications for procedure:  Patient is a 56-year-old female with history of chronic right Achilles tendinitis with large posterior calcaneal exostosis.  Patient has been treated conservatively, with anti-inflammatory medications, oral steroids,  immobilization with no significant improvement.  Due to chronic nature of this condition, failure of conservative care, she elects for surgical intervention.  The planned procedure has been discussed in great detail with the patient, including prolonged postoperative course and need for nonweightbearing.  All questions were answered.  Informed consent was signed and placed in the patient's chart.  Preoperative H and P, as well as cardiac risk stratification was obtained.     Procedure in detail:  On 02/18/22, the patient was brought to the operating room via cart.  After the administration of spinal anesthesia, a well-padded pneumatic tourniquet was then placed on the right thigh.  Patient was then rolled to operating room table in the prone position, securing padding of all bony  prominences.  The right foot and lower leg were then prepped and draped in the usual aseptic technique.   A time-out was then performed to ensure correct patient, procedure, limb designation.  The right foot and lower extremity was then exsanguinated using Esmarch bandage and the tourniquet inflated to 250mmHg. Attention was now directed to the posterior aspect of the right heel, where a step-down incision was made with proximal arm medial, transverse arm overlying bony shelf, distal arm lateral.  Full-thickness skin incision was made, and Achilles tendon was visualized.  At this time, Achilles tendon was reflected off the posterior heel spur and superior and posterior aspects of the calcaneus.  Of note, the tendon was thickened with gouty tophi, no fibrosis appreciated.  Complete excision of superior and posterior heel spurs was performed using both sagittal saw and osteotomes.  Confirmation of resection was done using C-arm, all bony edges were smoothed using a bone rasp.  [employer] sonic anchor was then utilized to reapproximate tendon back down to bone, utilizing 3 anchors as well as #2 suture tape. The surgical site was then copiously irrigated using normal saline and the tourniquet deflated with prompt hyperemic response was noted to all digits of the right foot.  Deep tissues were  reapproximated using 3 0 Vicryl, skin edges reapproximated using 3 0 nylon in horizontal suture technique.  There was no tension on the skin edges.  The surgical site was then infiltrated with 10 mL of 0.5% Marcaine plain to provide postop pain control.  The incision was then dressed with adaptic, along with 4 x 4 gauze, Kerlix and a well-padded posterior splint.  Patient tolerated both anesthesia and the procedure well.  She was transported to recovery with vital signs stable and neurovascular status intact checked all digits of this right foot.  After a brief stay in recovery, patient will be discharged to home with both written  and verbal postop instructions, including icing, elevation of the right foot, nonweightbearing on the right foot with use of crutches or scooter/wheelchair.  Patient has follow-up appointment with me next week, prescription for Norco 10/325 was previously written for postop for pain control.  Should any issues arise, she is to contact clinic immediately or seek care at the nearest emergency department.

## 2022-06-07 PROBLEM — G47.10 HYPERSOMNOLENCE: Status: ACTIVE | Noted: 2022-06-07

## 2022-06-07 PROBLEM — G47.00 INSOMNIA: Status: ACTIVE | Noted: 2022-06-07

## 2022-06-07 PROBLEM — R30.0 DYSURIA: Status: ACTIVE | Noted: 2022-06-07

## 2022-07-05 PROBLEM — U07.1 COVID-19: Status: ACTIVE | Noted: 2022-07-05

## 2022-07-12 PROBLEM — E78.49 FCHL (FAMILIAL COMBINED HYPERLIPIDEMIA): Status: ACTIVE | Noted: 2022-07-12

## 2022-07-12 PROBLEM — E66.01 MORBID (SEVERE) OBESITY DUE TO EXCESS CALORIES: Status: ACTIVE | Noted: 2021-02-19

## 2022-07-12 PROBLEM — E88.810 METABOLIC SYNDROME: Status: ACTIVE | Noted: 2022-07-12

## 2022-07-12 PROBLEM — E78.01 FAMILIAL HYPERCHOLESTEROLEMIA: Status: ACTIVE | Noted: 2022-07-12

## 2022-07-20 PROBLEM — R79.82 ELEVATED C-REACTIVE PROTEIN: Status: ACTIVE | Noted: 2022-07-20

## 2022-07-20 PROBLEM — R80.9 MICROALBUMINURIA: Status: ACTIVE | Noted: 2022-07-20

## 2022-08-15 NOTE — DISCHARGE INSTRUCTIONS
BEFORE THE PROCEDURE:    REPORT ANY CHANGE IN YOUR PHYSICAL CONDITION TO YOUR DOCTOR IMMEDIATELY.  SELF ISOLATE AND CHECK TEMPERATURE DAILY, IF TEMP OVER 100, CALL PHYSICIAN IMMEDIATELY.  TRY TO REFRAIN FROM SMOKING AND ALCOHOL 72 HOURS BEFORE YOUR PROCEDURE.   DO NOT EAT OR DRINK ANYTHING AFTER MIDNIGHT THE NIGHT BEFORE YOUR PROCEDURE.  NO MAKE UP, NAIL POLISH OR JEWELRY.      SURGERY PREP    SOMEONE WILL CALL YOU THE DAY BEFORE YOUR PROCEDURE WITH A CHECK-IN TIME FOR YOUR PROCEDURE.    DAY OF YOUR PROCEDURE:    TAKE BLOOD PRESSURE MEDICATIONS THE MORNING OF YOUR PROCEDURE, WITH SMALL SIPS WATER, AS DIRECTED BY YOUR PHYSICIAN.   DO NOT TAKE ANY DIABETIC MEDICATIONS UNLESS DIRECTED TO DO SO BY YOUR PHYSICIAN.   CONTACT LENSES AND DENTURES MUST BE REMOVED.  A RESPONSIBLE ADULT MUST ACCOMPANY YOU HOME UPON DISCHARGE.   ONLY 1 VISITOR ALLOWED PER ROOM.         YOUR THOUGHTS AND OPINIONS HELP US TO BETTER SERVE YOU.     PLEASE PARTICIPATE IN SURVEYS ABOUT YOUR CARE.    THANK YOU FOR CHOOSING [facility].

## 2022-08-16 ENCOUNTER — HOSPITAL ENCOUNTER (OUTPATIENT)
Dept: PREADMISSION TESTING | Facility: HOSPITAL | Age: 57
Discharge: HOME | End: 2022-08-16
Attending: PODIATRIST
Payer: COMMERCIAL

## 2022-08-16 ENCOUNTER — ANESTHESIA EVENT (OUTPATIENT)
Dept: SURGERY | Facility: HOSPITAL | Age: 57
End: 2022-08-16
Payer: COMMERCIAL

## 2022-08-16 VITALS — BODY MASS INDEX: 33.75 KG/M2 | HEIGHT: 66 IN | WEIGHT: 210 LBS

## 2022-08-16 NOTE — ANESTHESIA PREPROCEDURE EVALUATION
08/16/2022  Tamra Brock is a 56 y.o., female.      Pre-op Assessment    I have reviewed the Patient Summary Reports.    I have reviewed the NPO Status.   I have reviewed the Medications.     Review of Systems  Anesthesia Hx:  No problems with previous Anesthesia HX GASTRIC BANDING History of prior surgery of interest to airway management or planning: Denies Family Hx of Anesthesia complications.   Denies Personal Hx of Anesthesia complications.   Social:  Non-Smoker    Cardiovascular:   Hypertension, well controlled ECG has been reviewed. ABLE CLEARANCE   Pulmonary:  Pulmonary Normal HX COVID   Renal/:  Renal/ Normal     Hepatic/GI:   Liver Disease, STEATOHEPATITIS   Neurological:   Neuromuscular Disease, CERVICAL RAD   Endocrine:  Endocrine Normal      Lab Results   Component Value Date    WBC 5.77 08/16/2022    HGB 13.9 08/16/2022    HCT 41.4 08/16/2022    MCV 84 08/16/2022     08/16/2022     CMP  Sodium   Date Value Ref Range Status   08/16/2022 140 136 - 145 mmol/L Final     Potassium   Date Value Ref Range Status   08/16/2022 4.4 3.5 - 5.1 mmol/L Final     Chloride   Date Value Ref Range Status   08/16/2022 108 95 - 110 mmol/L Final     CO2   Date Value Ref Range Status   08/16/2022 28 23 - 29 mmol/L Final     Glucose   Date Value Ref Range Status   08/16/2022 108 70 - 110 mg/dL Final     BUN   Date Value Ref Range Status   08/16/2022 14 6 - 20 mg/dL Final     Creatinine   Date Value Ref Range Status   08/16/2022 1.0 0.5 - 1.4 mg/dL Final     Calcium   Date Value Ref Range Status   08/16/2022 9.8 8.7 - 10.5 mg/dL Final     Albumin   Date Value Ref Range Status   06/07/2022 4.7 3.8 - 4.9 g/dL Final     Total Bilirubin   Date Value Ref Range Status   06/07/2022 0.5 0.0 - 1.2 mg/dL Final     AST   Date Value Ref Range Status   06/07/2022 56 (H) 0 - 40 IU/L Final     ALT   Date Value Ref  Range Status   06/07/2022 95 (H) 0 - 32 IU/L Final     Anion Gap   Date Value Ref Range Status   08/16/2022 4 (L) 8 - 16 mmol/L Final     eGFR if    Date Value Ref Range Status   07/12/2022 >60 >60 mL/min/1.73 m^2 Final     eGFR if non    Date Value Ref Range Status   07/12/2022 >60 >60 mL/min/1.73 m^2 Final     Comment:     Calculation used to obtain the estimated glomerular filtration  rate (eGFR) is the CKD-EPI equation.          Physical Exam  General: Well nourished    Airway:  Mallampati: II / II  Mouth Opening: Normal  TM Distance: Normal  Tongue: Normal  Neck ROM: Normal ROM    Dental:  Intact    Chest/Lungs:  Clear to auscultation    Heart:  Rate: Normal  Rhythm: Regular Rhythm  Sounds: Normal        Anesthesia Plan  Type of Anesthesia, risks & benefits discussed:    Anesthesia Type: Spinal, Gen ETT  Intra-op Monitoring Plan: Standard ASA Monitors  Post Op Pain Control Plan: multimodal analgesia  Induction:  IV  Airway Plan: Direct  Informed Consent: Informed consent signed with the Patient and all parties understand the risks and agree with anesthesia plan.  All questions answered.   ASA Score: 3  Day of Surgery Review of History & Physical: I have interviewed and examined the patient. I have reviewed the patient's H&P dated: There are no significant changes.     Ready For Surgery From Anesthesia Perspective.     .

## 2022-08-18 NOTE — H&P (VIEW-ONLY)
Clinic Note  8/18/2022      Subjective:       Patient ID:  Tamra is a 56 y.o. female being seen for a clinic visit.      Chief Complaint: Pre-op Exam (PreOp evaluation )    Tamra presents to the clinic today for a PreOp evaluation for Dr. RIVER Khalil.   Scheduled on 8/19/2022 for left achilles tendon repair with bone spur resection.      Family History   Problem Relation Age of Onset    Coronary artery disease Mother     Hyperlipidemia Mother     Dementia Mother     Hypertension Mother     Hyperlipidemia Father     Basal cell carcinoma Father     Acute myelogenous leukemia Father     Depression Father     Diabetes Father     Hypertension Father     Sleep apnea Father     Heart attack Father     No Known Problems Brother      Social History     Socioeconomic History    Marital status:    Tobacco Use    Smoking status: Never Smoker    Smokeless tobacco: Never Used   Substance and Sexual Activity    Alcohol use: Not Currently    Drug use: Never     Past Surgical History:   Procedure Laterality Date    ACHILLES TENDON SURGERY  01/09/2018 & 11/7/2017    repair torn/ detached to left foot    COLONOSCOPY  2014    CYSTOSCOPY  2015    ESOPHAGOGASTRODUODENOSCOPY  08/2015    LAPAROSCOPIC GASTRIC BANDING  09/2015    PARTIAL PENECTOMY  12/2015    RADICAL HYSTERECTOMY  07/2015    REPAIR OF ACHILLES TENDON Right 2/18/2022    Procedure: REPAIR, TENDON, ACHILLES;  Surgeon: Rick Khalil DPM;  Location: [facility] OR;  Service: Podiatry;  Laterality: Right;  aihuishou sonic anchor-Rep notified  2ND 0600    ROTATOR CUFF REPAIR Left     SURGICAL REMOVAL OF RETROCALCANEAL EXOSTOSIS Right 2/18/2022    Procedure: EXCISION, EXOSTOSIS, RETROCALCANEAL;  Surgeon: Rick Khalil DPM;  Location: [facility] OR;  Service: Podiatry;  Laterality: Right;    TRIGGER FINGER RELEASE  08/2015     Medication List with Changes/Refills   Current Medications    ALPRAZOLAM (XANAX) 0.25 MG TABLET    Take 1 tablet (0.25 mg  "total) by mouth nightly as needed for Anxiety.    COLCHICINE (COLCRYS) 0.6 MG TABLET    Take 0.6 mg by mouth 2 (two) times daily.    HYDROCODONE-ACETAMINOPHEN (NORCO)  MG PER TABLET        INDAPAMIDE (LOZOL) 1.25 MG TAB    Take 1 tablet (1.25 mg total) by mouth once daily.    ROSUVASTATIN (CRESTOR) 10 MG TABLET    Take 1 tablet (10 mg total) by mouth once daily.    TELMISARTAN (MICARDIS) 20 MG TAB    Take 1 tablet (20 mg total) by mouth once daily.     Patient Active Problem List   Diagnosis    Cervical radiculopathy    Morbid (severe) obesity due to excess calories    Essential fatty acid (EFA) deficiency    Primary hypertension    Hyperlipidemia    Vitamin D deficiency    RUIZ (nonalcoholic steatohepatitis)    Impaired fasting glucose    Dysphagia    Gout of left foot    Preop examination    Insomnia    Hypersomnolence    Dysuria    COVID-19    Familial hypercholesterolemia    FCHL (familial combined hyperlipidemia)    Metabolic syndrome    Microalbuminuria    Elevated C-reactive protein     Review of Systems   Constitutional: Negative for chills and fever.   HENT: Negative for ear pain and sore throat.    Eyes: Negative for blurred vision and double vision.   Respiratory: Negative for cough and wheezing.    Cardiovascular: Negative for chest pain and palpitations.   Gastrointestinal: Negative for abdominal pain, constipation and diarrhea.   Genitourinary: Negative for dysuria.   Musculoskeletal: Negative for joint pain and myalgias.   Skin: Negative for rash.   Neurological: Negative for weakness and headaches.   Endo/Heme/Allergies: Does not bruise/bleed easily.   Psychiatric/Behavioral: Negative for depression and suicidal ideas.   All other systems reviewed and are negative.        Objective:      /84 (BP Location: Left arm, Patient Position: Sitting)   Pulse 92   Resp 16   Ht 5' 6" (1.676 m)   Wt 96.2 kg (212 lb)   SpO2 99%   BMI 34.22 kg/m²   Estimated body mass index is " "34.22 kg/m² as calculated from the following:    Height as of this encounter: 5' 6" (1.676 m).    Weight as of this encounter: 96.2 kg (212 lb).  Physical Exam  Vitals and nursing note reviewed.   Constitutional:       Appearance: Normal appearance. She is obese.   HENT:      Head: Normocephalic and atraumatic.      Nose: Nose normal.      Mouth/Throat:      Pharynx: Oropharynx is clear.   Eyes:      Extraocular Movements: Extraocular movements intact.      Conjunctiva/sclera: Conjunctivae normal.      Pupils: Pupils are equal, round, and reactive to light.   Cardiovascular:      Rate and Rhythm: Normal rate and regular rhythm.      Pulses: Normal pulses.   Pulmonary:      Effort: Pulmonary effort is normal.      Breath sounds: Normal breath sounds.   Abdominal:      General: Abdomen is flat. Bowel sounds are normal.      Palpations: Abdomen is soft.   Musculoskeletal:         General: Swelling present. Normal range of motion.      Cervical back: Normal range of motion and neck supple.   Skin:     General: Skin is warm and dry.   Neurological:      General: No focal deficit present.      Mental Status: She is alert and oriented to person, place, and time. Mental status is at baseline.   Psychiatric:         Mood and Affect: Mood normal.         Behavior: Behavior normal.         Thought Content: Thought content normal.         Judgment: Judgment normal.            Assessment and Plan:           Problem List Items Addressed This Visit        Cardiac/Vascular    Primary hypertension    Current Assessment & Plan     Likely secondary to indomethacin use will discontinue nonsteroidal anti-inflammatory drugs then reevaluate. Will start HCTZ 12.5 mg po q am.     BP Readings from Last 3 Encounters:   07/20/22 137/83   07/12/22 (!) 148/96   06/07/22 (!) 162/101     6/7/22: patient never followed up after last visit. Taking HCTZ regularly. Patient reports stopping all NSAIDs. Will add amlodipine 10 mg daily. Patient getting " labs drawn today. Patient to RTC in 2 weeks for lab review and BP check.              Hyperlipidemia    Current Assessment & Plan      as of 9/11/2020,  as of 3/2021.   Lab Results   Component Value Date    CHOL 296 (H) 06/07/2022    CHOL 251 (H) 02/21/2022    CHOL 265 (H) 11/10/2021     Lab Results   Component Value Date    HDL 52 06/07/2022    HDL 58 02/21/2022    HDL 52 11/10/2021     Lab Results   Component Value Date    LDLCALC 201 (H) 06/07/2022    LDLCALC 164 (H) 02/21/2022    LDLCALC 185 (H) 11/10/2021     Lab Results   Component Value Date    TRIG 224 (H) 06/07/2022    TRIG 160 (H) 02/21/2022    TRIG 152 (H) 11/10/2021     Recommend lifestyle modifications with diet and exercise.               Endocrine    Morbid (severe) obesity due to excess calories    Current Assessment & Plan     Obesity: Stay Active. As tolerated alternate resistance training with stretching and cardio. Goal of 150 minutes per week or 7,500 - 10,000 steps per day. Follow the Mediterranean Diet. Include fruit, vegetables, olive oil, seeds, nuts, whole grains, cold water fish and lean cuts of meat.  Do not drink beverages with any caloric content.  Decrease portion sizes slightly which will result in an approximately 500-calorie deficit.  Consider substituting one meal a day with a meal replacement such as Slim fast, lean cuisine, or weight watcher's.  Avoid fast or fried food.    lap band in 2008.  75 pound weight loss total.  Failed adipex in past (palpitations).  Failed Qsymia (didnt feel right).  Failed Belviq due to headache and fatigue. Will consider contrave in future. began Contrave 11/18/2020. but stopped suddenly due to side effects (generalized malaise/fatigue)    Wt Readings from Last 3 Encounters:   08/18/22 1349 96.2 kg (212 lb)   08/16/22 1043 95.3 kg (210 lb)   07/20/22 1048 96.2 kg (212 lb)                   Other    Preop examination    Current Assessment & Plan     Perioperative cardiovascular risk  assessment:  Patient is low cardiovascular risk.  Surgeon: Dr. Rick Khalil  Ph#: [phone]  Fax#: [phone]  Procedure: left achilles tendon repair with bone spur resection  Precedure date: 8/19/2022  Procedure risk: Low   Requested labs will be ordered if necessary.    Medication recommendations as follows:  Continue crestor perioperatively.  Hold all other medication in the perioperative setting.     May proceed to surgery without further risk stratification.                         Follow up:   Follow up if symptoms worsen or fail to improve.     Other Orders Placed This Visit:  No orders of the defined types were placed in this encounter.          Praneeth Scott Jr

## 2022-08-19 ENCOUNTER — HOSPITAL ENCOUNTER (OUTPATIENT)
Facility: HOSPITAL | Age: 57
Discharge: HOME | End: 2022-08-19
Attending: PODIATRIST | Admitting: PODIATRIST
Payer: COMMERCIAL

## 2022-08-19 ENCOUNTER — ANESTHESIA (OUTPATIENT)
Dept: SURGERY | Facility: HOSPITAL | Age: 57
End: 2022-08-19
Payer: COMMERCIAL

## 2022-08-19 DIAGNOSIS — M77.32 POSTERIOR CALCANEAL EXOSTOSIS, LEFT: ICD-10-CM

## 2022-08-19 PROCEDURE — 71000016 HC POSTOP RECOV ADDL HR: Performed by: PODIATRIST

## 2022-08-19 PROCEDURE — 63600175 PHARM REV CODE 636 W HCPCS: Performed by: PODIATRIST

## 2022-08-19 PROCEDURE — C1713 ANCHOR/SCREW BN/BN,TIS/BN: HCPCS | Performed by: PODIATRIST

## 2022-08-19 PROCEDURE — 63600175 PHARM REV CODE 636 W HCPCS: Performed by: NURSE ANESTHETIST, CERTIFIED REGISTERED

## 2022-08-19 PROCEDURE — 71000033 HC RECOVERY, INTIAL HOUR: Performed by: PODIATRIST

## 2022-08-19 PROCEDURE — 25000003 PHARM REV CODE 250: Performed by: PODIATRIST

## 2022-08-19 PROCEDURE — 36000709 HC OR TIME LEV III EA ADD 15 MIN: Performed by: PODIATRIST

## 2022-08-19 PROCEDURE — 25000003 PHARM REV CODE 250: Performed by: NURSE ANESTHETIST, CERTIFIED REGISTERED

## 2022-08-19 PROCEDURE — 37000008 HC ANESTHESIA 1ST 15 MINUTES: Performed by: PODIATRIST

## 2022-08-19 PROCEDURE — 36000708 HC OR TIME LEV III 1ST 15 MIN: Performed by: PODIATRIST

## 2022-08-19 PROCEDURE — 37000009 HC ANESTHESIA EA ADD 15 MINS: Performed by: PODIATRIST

## 2022-08-19 PROCEDURE — 27201423 OPTIME MED/SURG SUP & DEVICES STERILE SUPPLY: Performed by: PODIATRIST

## 2022-08-19 PROCEDURE — 71000015 HC POSTOP RECOV 1ST HR: Performed by: PODIATRIST

## 2022-08-19 DEVICE — KIT SONICANCHOR F/F #2 C-7: Type: IMPLANTABLE DEVICE | Site: ACHILLES TENDON | Status: FUNCTIONAL

## 2022-08-19 RX ORDER — ACETAMINOPHEN 10 MG/ML
INJECTION, SOLUTION INTRAVENOUS
Status: DISCONTINUED | OUTPATIENT
Start: 2022-08-19 | End: 2022-08-19

## 2022-08-19 RX ORDER — HYDROCODONE BITARTRATE AND ACETAMINOPHEN 10; 325 MG/1; MG/1
1 TABLET ORAL EVERY 4 HOURS PRN
Status: CANCELLED | OUTPATIENT
Start: 2022-08-19

## 2022-08-19 RX ORDER — CEFAZOLIN SODIUM 2 G/50ML
2 SOLUTION INTRAVENOUS
Status: COMPLETED | OUTPATIENT
Start: 2022-08-19 | End: 2022-08-19

## 2022-08-19 RX ORDER — HYDROCODONE BITARTRATE AND ACETAMINOPHEN 5; 325 MG/1; MG/1
1 TABLET ORAL EVERY 4 HOURS PRN
Status: CANCELLED | OUTPATIENT
Start: 2022-08-19

## 2022-08-19 RX ORDER — BUPIVACAINE HYDROCHLORIDE 5 MG/ML
INJECTION, SOLUTION EPIDURAL; INTRACAUDAL
Status: DISCONTINUED | OUTPATIENT
Start: 2022-08-19 | End: 2022-08-19 | Stop reason: HOSPADM

## 2022-08-19 RX ORDER — IBUPROFEN 600 MG/1
600 TABLET ORAL EVERY 6 HOURS PRN
Status: CANCELLED | OUTPATIENT
Start: 2022-08-19

## 2022-08-19 RX ORDER — ONDANSETRON 4 MG/1
8 TABLET, ORALLY DISINTEGRATING ORAL EVERY 8 HOURS PRN
Status: CANCELLED | OUTPATIENT
Start: 2022-08-19

## 2022-08-19 RX ORDER — MIDAZOLAM HYDROCHLORIDE 1 MG/ML
INJECTION INTRAMUSCULAR; INTRAVENOUS
Status: DISCONTINUED | OUTPATIENT
Start: 2022-08-19 | End: 2022-08-19

## 2022-08-19 RX ORDER — SODIUM CHLORIDE 9 MG/ML
INJECTION, SOLUTION INTRAVENOUS CONTINUOUS
Status: ACTIVE | OUTPATIENT
Start: 2022-08-19

## 2022-08-19 RX ORDER — ONDANSETRON 2 MG/ML
4 INJECTION INTRAMUSCULAR; INTRAVENOUS DAILY PRN
Status: DISCONTINUED | OUTPATIENT
Start: 2022-08-19 | End: 2022-08-19 | Stop reason: HOSPADM

## 2022-08-19 RX ORDER — BUPIVACAINE HYDROCHLORIDE 7.5 MG/ML
INJECTION, SOLUTION EPIDURAL; RETROBULBAR
Status: COMPLETED | OUTPATIENT
Start: 2022-08-19 | End: 2022-08-19

## 2022-08-19 RX ORDER — ONDANSETRON 2 MG/ML
INJECTION INTRAMUSCULAR; INTRAVENOUS
Status: DISCONTINUED | OUTPATIENT
Start: 2022-08-19 | End: 2022-08-19

## 2022-08-19 RX ORDER — HYDROMORPHONE HYDROCHLORIDE 1 MG/ML
0.5 INJECTION, SOLUTION INTRAMUSCULAR; INTRAVENOUS; SUBCUTANEOUS EVERY 5 MIN PRN
Status: DISCONTINUED | OUTPATIENT
Start: 2022-08-19 | End: 2022-08-19 | Stop reason: HOSPADM

## 2022-08-19 RX ORDER — DIPHENHYDRAMINE HYDROCHLORIDE 50 MG/ML
25 INJECTION INTRAMUSCULAR; INTRAVENOUS EVERY 6 HOURS PRN
Status: DISCONTINUED | OUTPATIENT
Start: 2022-08-19 | End: 2022-08-19 | Stop reason: HOSPADM

## 2022-08-19 RX ORDER — SODIUM CHLORIDE 9 MG/ML
INJECTION, SOLUTION INTRAVENOUS CONTINUOUS
Status: DISCONTINUED | OUTPATIENT
Start: 2022-08-19 | End: 2022-08-19 | Stop reason: HOSPADM

## 2022-08-19 RX ORDER — LIDOCAINE HYDROCHLORIDE 10 MG/ML
1 INJECTION, SOLUTION EPIDURAL; INFILTRATION; INTRACAUDAL; PERINEURAL ONCE
Status: DISPENSED | OUTPATIENT
Start: 2022-08-19

## 2022-08-19 RX ORDER — MORPHINE SULFATE 4 MG/ML
4 INJECTION, SOLUTION INTRAMUSCULAR; INTRAVENOUS EVERY 5 MIN PRN
Status: DISCONTINUED | OUTPATIENT
Start: 2022-08-19 | End: 2022-08-19 | Stop reason: HOSPADM

## 2022-08-19 RX ORDER — FENTANYL CITRATE 50 UG/ML
INJECTION, SOLUTION INTRAMUSCULAR; INTRAVENOUS
Status: DISCONTINUED | OUTPATIENT
Start: 2022-08-19 | End: 2022-08-19

## 2022-08-19 RX ORDER — SODIUM CHLORIDE 0.9 % (FLUSH) 0.9 %
10 SYRINGE (ML) INJECTION
Status: ACTIVE | OUTPATIENT
Start: 2022-08-19

## 2022-08-19 RX ADMIN — MIDAZOLAM 1 MG: 1 INJECTION INTRAMUSCULAR; INTRAVENOUS at 08:08

## 2022-08-19 RX ADMIN — ONDANSETRON 4 MG: 2 INJECTION INTRAMUSCULAR; INTRAVENOUS at 07:08

## 2022-08-19 RX ADMIN — ACETAMINOPHEN 1000 MG: 10 INJECTION, SOLUTION INTRAVENOUS at 09:08

## 2022-08-19 RX ADMIN — MIDAZOLAM 2 MG: 1 INJECTION INTRAMUSCULAR; INTRAVENOUS at 08:08

## 2022-08-19 RX ADMIN — BUPIVACAINE HYDROCHLORIDE 1.8 ML: 7.5 INJECTION, SOLUTION EPIDURAL; RETROBULBAR at 07:08

## 2022-08-19 RX ADMIN — CEFAZOLIN SODIUM 2 G: 2 SOLUTION INTRAVENOUS at 06:08

## 2022-08-19 RX ADMIN — FENTANYL CITRATE 80 MCG: 50 INJECTION INTRAMUSCULAR; INTRAVENOUS at 08:08

## 2022-08-19 RX ADMIN — MIDAZOLAM 2 MG: 1 INJECTION INTRAMUSCULAR; INTRAVENOUS at 07:08

## 2022-08-19 RX ADMIN — FENTANYL CITRATE 20 MCG: 50 INJECTION INTRAMUSCULAR; INTRAVENOUS at 07:08

## 2022-08-19 RX ADMIN — SODIUM CHLORIDE: 0.9 INJECTION, SOLUTION INTRAVENOUS at 06:08

## 2022-08-19 RX ADMIN — MIDAZOLAM 3 MG: 1 INJECTION INTRAMUSCULAR; INTRAVENOUS at 07:08

## 2022-08-19 NOTE — DISCHARGE INSTRUCTIONS
Follow up with Dr. Khalil as scheduled    Non weight bearing to left leg    Do not drive a vehicle until instructed by your physician    Do not drink alcohol today or while taking pain medication    Thank you for choosing [facility]    Call Dr. Khalil for any problem with severe uncontrolled pain, persistent nausea or vomiting or fever greater than 100.4 or for any questions or concerns.    Use an ice pack to left lower extremity as needed for pain or swelling for the first few days as instructed on discharge papers.

## 2022-08-19 NOTE — BRIEF OP NOTE
[facility] - Surgery  Brief Operative Note    Surgery Date: 8/19/2022     Surgeon(s) and Role:     * Rick Khalil DPM - Primary     * Jaime Heck DPM - Assisting        Pre-op Diagnosis:  Tendonitis, Achilles, left [M76.62]  Calcaneal spur of left foot [M77.32]    Post-op Diagnosis:  Post-Op Diagnosis Codes:     * Tendonitis, Achilles, left [M76.62]     * Calcaneal spur of left foot [M77.32]    Procedure(s) (LRB):  EXCISION, EXOSTOSIS, RETROCALCANEAL (Left)  REPAIR, RUPTURE, TENDON, ACHILLES (Left)    Anesthesia: Spinal with local 10ml 0.5% marcaine plain    Operative Findings: Thickened Achilles tendon with multiple deposits of tophaceous gout, most prominent at previous nonabsorbable suture tracks    Estimated Blood Loss: Less than 5ml         Specimens:   Specimen (24h ago, onward)            None            Discharge Note    OUTCOME: Patient tolerated treatment/procedure well without complication and is now ready for discharge.    DISPOSITION: Home or Self Care    FINAL DIAGNOSIS:  <principal problem not specified>    FOLLOWUP: In clinic    DISCHARGE INSTRUCTIONS:  No discharge procedures on file.

## 2022-08-19 NOTE — ANESTHESIA PROCEDURE NOTES
Spinal    Diagnosis: L ACHILLIS TENDONITIS  Patient location during procedure: OR  Start time: 8/19/2022 7:45 AM  Timeout: 8/19/2022 7:44 AM  End time: 8/19/2022 7:48 AM    Staffing  Authorizing Provider: Erwin Austin CRNA  Performing Provider: Erwin Austin CRNA    Preanesthetic Checklist  Completed: patient identified, IV checked, site marked, risks and benefits discussed, surgical consent, monitors and equipment checked, pre-op evaluation and timeout performed  Spinal Block  Patient position: sitting  Prep: Betadine  Patient monitoring: heart rate, cardiac monitor, frequent blood pressure checks and continuous pulse ox  Approach: midline  Location: L3-4  Injection technique: lumbar puncture  CSF Fluid: clear free-flowing CSF  Needle  Needle type: pencil-tip   Needle gauge: 25 G  Needle length: 3.5 in  Additional Documentation: negative aspiration for heme and no paresthesia on injection  Needle localization: anatomical landmarks  Assessment  Sensory level: T4   Dermatomal levels determined by pinch or prick  Ease of block: easy  Patient's tolerance of the procedure: comfortable throughout block and no complaints  Medications:    Medications: bupivacaine (pf) (MARCAINE) injection 0.75% - Intraspinal, Left Back   1.8 mL - 8/19/2022 7:46:00 AM

## 2022-08-19 NOTE — PLAN OF CARE
Patient ate lunch without problems, denies pain and nausea.  States she has feeling up to her knees and sensation and movement to toes on her left foot.  Encouraged to move legs a little more while laying in bed.

## 2022-08-19 NOTE — TRANSFER OF CARE
Anesthesia Transfer of Care Note    Patient: Tamra Brock    Procedure(s) Performed: Procedure(s) (LRB):  EXCISION, EXOSTOSIS, RETROCALCANEAL (Left)  REPAIR, RUPTURE, TENDON, ACHILLES (Left)    Patient location: PACU    Anesthesia Type: spinal    Transport from OR: Transported from OR on room air with adequate spontaneous ventilation    Post pain: adequate analgesia    Post assessment: no apparent anesthetic complications    Post vital signs: stable    Level of consciousness: awake    Nausea/Vomiting: no nausea/vomiting    Complications: none    Transfer of care protocol was followed      Last vitals:   112/63  16 RR  37 C TEMP  71 HR  99% O 2SAT

## 2022-08-19 NOTE — ANESTHESIA POSTPROCEDURE EVALUATION
Anesthesia Post Evaluation    Patient: Tamra Brock    Procedure(s) Performed: Procedure(s) (LRB):  EXCISION, EXOSTOSIS, RETROCALCANEAL (Left)  REPAIR, RUPTURE, TENDON, ACHILLES (Left)    Final Anesthesia Type: spinal      Patient location during evaluation: OPS  Patient participation: Yes- Able to Participate  Level of consciousness: awake  Post-procedure vital signs: reviewed and stable  Pain management: adequate  Airway patency: patent    PONV status at discharge: No PONV  Anesthetic complications: no      Cardiovascular status: blood pressure returned to baseline  Respiratory status: spontaneous ventilation  Hydration status: euvolemic  Follow-up not needed.          Vitals Value Taken Time   /64 08/19/22 1006   Temp 36.4 °C (97.6 °F) 08/19/22 1006   Pulse 62 08/19/22 1006   Resp 16 08/19/22 1006   SpO2 95 % 08/19/22 1006         Event Time   Out of Recovery 09:58:07         Pain/Jin Score: Jin Score: 9 (8/19/2022  9:55 AM)

## 2022-08-19 NOTE — OP NOTE
Date of Procedure: 08/19/22     Surgeon:  RIVER Khalil DPM     Preop diagnosis:  1.  Retrocalcaneal exostosis, left                               2.  Achilles tendinitis/tendinopathy with previous rupture, left foot     Postoperative diagnosis: 1.  Retrocalcaneal exostosis, left                                           2.  Achilles tendinitis/tendinopathy with previous rupture, left foot     Procedure performed:  1.  Retrocalcaneal exostectomy, right                                         2.  Debridement of Achilles tendon with reattachment with sonic anchor     Anesthesia:  Spinal with local 10 mL of 0.5% Marcaine     Hemostasis:  Well-padded pneumatic tourniquet set to 250mmHg to right thigh for 70 minutes     Materials:  Sonic anchor with #2 force fiber, 3.0 Vicryl, 4.0 Nylon     Estimated blood loss:  Less than 5ml     Findings:  Thickening of Achilles tendon with large tophaceous deposits overlying tendon and along noabsorbable suture tracks in the tendon subtance; sharp, prominent posterior calcaneal exostosis      Complications:  None     Indications for procedure:  Patient is a 56-year-old female with history of chronic left Achilles tendinitis with history of prior Achilles tendon rupture and persistent posterior calcaneal exostosis.  Patient's original surgery performed 4 years ago, and Achilles tendon has continued to be painful, now with enlarging soft tissue masses/edema.  She has been treated conservatively, with anti-inflammatory medications, oral steroids,  immobilization with no significant improvement.  Due to chronic nature of this condition, failure of conservative care, she elects for surgical intervention.  The planned procedure has been discussed in great detail with the patient, including prolonged postoperative course and need for nonweightbearing.  All questions were answered.  Informed consent was signed and placed in the patient's chart.  Preoperative H and P, as well as cardiac risk  stratification was obtained.     Procedure in detail:  On 08/19/22, the patient was brought to the operating room via cart.  After the administration of spinal anesthesia, a well-padded pneumatic tourniquet was then placed on the left thigh.  Patient was then rolled to operating room table in the prone position, securing padding of all bony prominences.  The left foot and lower leg were then prepped and draped in the usual aseptic technique.   A time-out was then performed to ensure correct patient, procedure, limb designation.  The left foot and lower extremity was then exsanguinated using Esmarch bandage and the tourniquet inflated to 300mmHg. Attention was now directed to the posterior aspect of the left heel, where a step-down incision was made with proximal arm medial, transverse arm overlying bony shelf, distal arm lateral - the proximal arm of the incision was extended more proximal due to tendinopathy.  Full-thickness skin incision was made, and Achilles tendon was visualized, with multiple tophaceous deposits overlying tendon and along tracks of previously applied nonabsorbable suture.  These tophaceous deposits were sharply removed from the tendon.  At this time, Achilles tendon was reflected off the superior and posterior aspects of the calcaneus.  Complete excision of superior and posterior heel spurs was performed using both sagittal saw and osteotomes.  Confirmation of resection was done using C-arm, all bony edges were smoothed using a bone rasp.  [employer] sonic anchor was then utilized to reapproximate tendon back down to bone, utilizing 2 anchors as well as #2 force fiber. The surgical site was then copiously irrigated using normal saline.  Deep tissues were  reapproximated using 3 0 Vicryl, skin edges reapproximated using 3 0 nylon in horizontal suture technique.  There was no tension on the skin edges. The tourniquet was then deflated with prompt hyperemic response was noted to all digits of the  left foot.   The surgical site was then infiltrated with 10 mL of 0.5% Marcaine plain to provide postop pain control.  The incision was then dressed with adaptic, along with 4 x 4 gauze, Kerlix and a well-padded posterior splint.  Patient tolerated both anesthesia and the procedure well.  She was transported to recovery with vital signs stable and neurovascular status intact checked all digits of this left foot.  After a brief stay in recovery, patient will be discharged to home with both written and verbal postop instructions, including icing, elevation of the left foot, nonweightbearing on the left foot with use of crutches or scooter/wheelchair.  Patient has follow-up appointment with me next week, prescription for Norco 10/325 was previously written for postop for pain control.  Should any issues arise, she is to contact clinic immediately or seek care at the nearest emergency department.

## 2022-08-19 NOTE — PLAN OF CARE
At level T12, L1 dermatone, gross motor movement of bilateral lower ext.  Sleeping comfortably with family at bedside, denies pain or nausea.  Doesn't want to eat at this time, juice, muffin and fruit at bedside.  Siderails up and callbell within reach, instructed to call prn for assistance.

## 2022-08-19 NOTE — PLAN OF CARE
Patient needs to urinate, assisted to stand and was able to bear weight on right foot and use arms to get to BSC.  Voided without problems, wants to go home.  Discharge instructions reviewed with patient and spouse and she verbalized understanding.  To personal auto via wheelchair.

## 2022-08-19 NOTE — PLAN OF CARE
Still with no complaints of pain or nausea, tolerated a snack and juice.  More sensation present on lower ext and still gross motor present bilateral lower extremities.  Still hungry and would like a lunch tray and ice water, waiting for tray from dietary. Her spouse remains at the bedside, instructed to call prn for assistance.

## 2022-08-22 VITALS
TEMPERATURE: 98 F | HEART RATE: 73 BPM | OXYGEN SATURATION: 97 % | RESPIRATION RATE: 16 BRPM | SYSTOLIC BLOOD PRESSURE: 119 MMHG | DIASTOLIC BLOOD PRESSURE: 73 MMHG

## 2022-11-02 PROBLEM — E78.01 FAMILIAL HYPERCHOLESTEROLEMIA: Status: RESOLVED | Noted: 2022-07-12 | Resolved: 2022-11-02

## 2023-03-15 PROBLEM — D72.829 LEUKOCYTOSIS: Status: ACTIVE | Noted: 2023-03-15

## 2023-08-21 PROBLEM — R30.0 DYSURIA: Status: RESOLVED | Noted: 2022-06-07 | Resolved: 2023-08-21

## 2023-08-21 PROBLEM — U07.1 COVID-19: Status: RESOLVED | Noted: 2022-07-05 | Resolved: 2023-08-21

## 2024-01-08 ENCOUNTER — HOSPITAL ENCOUNTER (OUTPATIENT)
Dept: RADIOLOGY | Facility: HOSPITAL | Age: 59
Discharge: HOME | End: 2024-01-08
Attending: NURSE PRACTITIONER
Payer: COMMERCIAL

## 2024-01-08 DIAGNOSIS — B34.9 VIRAL SYNDROME: ICD-10-CM

## 2024-01-08 PROCEDURE — 71046 X-RAY EXAM CHEST 2 VIEWS: CPT | Mod: TC

## 2024-03-22 ENCOUNTER — LAB VISIT (OUTPATIENT)
Dept: LAB | Facility: HOSPITAL | Age: 59
End: 2024-03-22
Attending: INTERNAL MEDICINE
Payer: COMMERCIAL

## 2024-03-22 DIAGNOSIS — E63.0 ESSENTIAL FATTY ACID (EFA) DEFICIENCY: ICD-10-CM

## 2024-03-22 DIAGNOSIS — E78.5 HYPERLIPIDEMIA, UNSPECIFIED HYPERLIPIDEMIA TYPE: ICD-10-CM

## 2024-03-22 DIAGNOSIS — R79.82 ELEVATED C-REACTIVE PROTEIN: ICD-10-CM

## 2024-03-22 DIAGNOSIS — R73.01 IFG (IMPAIRED FASTING GLUCOSE): ICD-10-CM

## 2024-03-22 DIAGNOSIS — G47.00 INSOMNIA, UNSPECIFIED TYPE: ICD-10-CM

## 2024-03-22 DIAGNOSIS — E78.00 PURE HYPERCHOLESTEROLEMIA: ICD-10-CM

## 2024-03-22 DIAGNOSIS — R73.01 IMPAIRED FASTING GLUCOSE: ICD-10-CM

## 2024-03-22 DIAGNOSIS — R80.9 MICROALBUMINURIA: ICD-10-CM

## 2024-03-22 DIAGNOSIS — E78.49 FCHL (FAMILIAL COMBINED HYPERLIPIDEMIA): ICD-10-CM

## 2024-03-22 DIAGNOSIS — E55.9 VITAMIN D DEFICIENCY: ICD-10-CM

## 2024-03-22 DIAGNOSIS — Z79.899 ENCOUNTER FOR LONG-TERM (CURRENT) USE OF OTHER MEDICATIONS: ICD-10-CM

## 2024-03-22 DIAGNOSIS — E88.810 METABOLIC SYNDROME: ICD-10-CM

## 2024-03-22 DIAGNOSIS — I10 PRIMARY HYPERTENSION: ICD-10-CM

## 2024-03-22 DIAGNOSIS — I10 HYPERTENSION, UNSPECIFIED TYPE: ICD-10-CM

## 2024-03-22 LAB
ALBUMIN SERPL BCP-MCNC: 4 G/DL (ref 3.5–5.2)
ALP SERPL-CCNC: 44 U/L (ref 55–135)
ALT SERPL W/O P-5'-P-CCNC: 100 U/L (ref 10–44)
ANION GAP SERPL CALC-SCNC: 8 MMOL/L (ref 3–11)
AST SERPL-CCNC: 53 U/L (ref 10–40)
BASOPHILS # BLD AUTO: 0.03 K/UL (ref 0–0.2)
BASOPHILS NFR BLD: 0.7 % (ref 0–1.9)
BILIRUB SERPL-MCNC: 0.5 MG/DL (ref 0.1–1)
BUN SERPL-MCNC: 15 MG/DL (ref 6–20)
CALCIUM SERPL-MCNC: 9.5 MG/DL (ref 8.7–10.5)
CHLORIDE SERPL-SCNC: 108 MMOL/L (ref 95–110)
CHOLEST SERPL-MCNC: 147 MG/DL (ref 120–199)
CHOLEST/HDLC SERPL: 2.7 {RATIO} (ref 2–5)
CO2 SERPL-SCNC: 26 MMOL/L (ref 23–29)
CREAT SERPL-MCNC: 1 MG/DL (ref 0.5–1.4)
CRP SERPL-MCNC: 2.16 MG/L (ref 0–3.19)
DIFFERENTIAL METHOD BLD: NORMAL
EOSINOPHIL # BLD AUTO: 0.1 K/UL (ref 0–0.5)
EOSINOPHIL NFR BLD: 2.7 % (ref 0–8)
ERYTHROCYTE [DISTWIDTH] IN BLOOD BY AUTOMATED COUNT: 13.2 % (ref 11.5–14.5)
EST. GFR  (NO RACE VARIABLE): >60 ML/MIN/1.73 M^2
ESTIMATED AVG GLUCOSE: 123 MG/DL (ref 68–131)
FOLATE SERPL-MCNC: 13.6 NG/ML (ref 4–24)
GLUCOSE SERPL-MCNC: 127 MG/DL (ref 70–110)
HBA1C MFR BLD: 5.9 % (ref 4–5.6)
HCT VFR BLD AUTO: 42.2 % (ref 37–48.5)
HDLC SERPL-MCNC: 54 MG/DL (ref 40–75)
HDLC SERPL: 36.7 % (ref 20–50)
HGB BLD-MCNC: 14.1 G/DL (ref 12–16)
IMM GRANULOCYTES # BLD AUTO: 0.01 K/UL (ref 0–0.04)
IMM GRANULOCYTES NFR BLD AUTO: 0.2 % (ref 0–0.5)
INSULIN COLLECTION INTERVAL: ABNORMAL
INSULIN SERPL-ACNC: 34.9 UU/ML
LDLC SERPL CALC-MCNC: 64.8 MG/DL (ref 63–159)
LYMPHOCYTES # BLD AUTO: 1.7 K/UL (ref 1–4.8)
LYMPHOCYTES NFR BLD: 38 % (ref 18–48)
MAGNESIUM SERPL-MCNC: 2 MG/DL (ref 1.6–2.6)
MCH RBC QN AUTO: 28.9 PG (ref 27–31)
MCHC RBC AUTO-ENTMCNC: 33.4 G/DL (ref 32–36)
MCV RBC AUTO: 87 FL (ref 82–98)
MONOCYTES # BLD AUTO: 0.4 K/UL (ref 0.3–1)
MONOCYTES NFR BLD: 8.4 % (ref 4–15)
NEUTROPHILS # BLD AUTO: 2.2 K/UL (ref 1.8–7.7)
NEUTROPHILS NFR BLD: 50 % (ref 38–73)
NONHDLC SERPL-MCNC: 93 MG/DL
NRBC BLD-RTO: 0 /100 WBC
PLATELET # BLD AUTO: 187 K/UL (ref 150–450)
PMV BLD AUTO: 10.8 FL (ref 9.2–12.9)
POTASSIUM SERPL-SCNC: 4.3 MMOL/L (ref 3.5–5.1)
PROT SERPL-MCNC: 7.3 G/DL (ref 6–8.4)
RBC # BLD AUTO: 4.88 M/UL (ref 4–5.4)
SODIUM SERPL-SCNC: 142 MMOL/L (ref 136–145)
T4 FREE SERPL-MCNC: 0.81 NG/DL (ref 0.71–1.51)
TRIGL SERPL-MCNC: 141 MG/DL (ref 30–150)
TSH SERPL DL<=0.005 MIU/L-ACNC: 5.06 UIU/ML (ref 0.4–4)
URATE SERPL-MCNC: 8.2 MG/DL (ref 2.4–5.7)
VIT B12 SERPL-MCNC: 714 PG/ML (ref 210–950)
WBC # BLD AUTO: 4.4 K/UL (ref 3.9–12.7)

## 2024-03-22 PROCEDURE — 83735 ASSAY OF MAGNESIUM: CPT | Performed by: INTERNAL MEDICINE

## 2024-03-22 PROCEDURE — 84443 ASSAY THYROID STIM HORMONE: CPT | Performed by: INTERNAL MEDICINE

## 2024-03-22 PROCEDURE — 84439 ASSAY OF FREE THYROXINE: CPT | Performed by: INTERNAL MEDICINE

## 2024-03-22 PROCEDURE — 82652 VIT D 1 25-DIHYDROXY: CPT | Performed by: INTERNAL MEDICINE

## 2024-03-22 PROCEDURE — 85025 COMPLETE CBC W/AUTO DIFF WBC: CPT | Performed by: INTERNAL MEDICINE

## 2024-03-22 PROCEDURE — 82746 ASSAY OF FOLIC ACID SERUM: CPT | Performed by: INTERNAL MEDICINE

## 2024-03-22 PROCEDURE — 36415 COLL VENOUS BLD VENIPUNCTURE: CPT | Performed by: INTERNAL MEDICINE

## 2024-03-22 PROCEDURE — 82607 VITAMIN B-12: CPT | Performed by: INTERNAL MEDICINE

## 2024-03-22 PROCEDURE — 80061 LIPID PANEL: CPT | Performed by: INTERNAL MEDICINE

## 2024-03-22 PROCEDURE — 80053 COMPREHEN METABOLIC PANEL: CPT | Performed by: INTERNAL MEDICINE

## 2024-03-22 PROCEDURE — 86141 C-REACTIVE PROTEIN HS: CPT | Performed by: INTERNAL MEDICINE

## 2024-03-22 PROCEDURE — 83036 HEMOGLOBIN GLYCOSYLATED A1C: CPT | Performed by: INTERNAL MEDICINE

## 2024-03-22 PROCEDURE — 83525 ASSAY OF INSULIN: CPT | Performed by: INTERNAL MEDICINE

## 2024-03-22 PROCEDURE — 84550 ASSAY OF BLOOD/URIC ACID: CPT | Performed by: INTERNAL MEDICINE

## 2024-03-25 LAB — 1,25(OH)2D3 SERPL-MCNC: 25 PG/ML (ref 20–79)

## 2024-03-28 PROBLEM — M46.1 SACROILIITIS, NOT ELSEWHERE CLASSIFIED: Status: ACTIVE | Noted: 2024-03-28

## 2024-03-28 PROBLEM — Z00.00 WELLNESS EXAMINATION: Status: ACTIVE | Noted: 2024-03-28

## 2024-03-28 PROBLEM — B34.9 VIRAL SYNDROME: Status: RESOLVED | Noted: 2024-01-08 | Resolved: 2024-03-28

## 2024-03-28 PROBLEM — R79.89 ELEVATED TSH: Status: ACTIVE | Noted: 2024-03-28

## 2024-03-28 PROBLEM — M65.331 TRIGGER MIDDLE FINGER OF RIGHT HAND: Status: ACTIVE | Noted: 2024-03-28

## 2024-05-03 ENCOUNTER — LAB VISIT (OUTPATIENT)
Dept: LAB | Facility: HOSPITAL | Age: 59
End: 2024-05-03
Attending: NURSE PRACTITIONER
Payer: COMMERCIAL

## 2024-05-03 DIAGNOSIS — E78.00 PURE HYPERCHOLESTEROLEMIA: ICD-10-CM

## 2024-05-03 DIAGNOSIS — R73.01 IMPAIRED FASTING GLUCOSE: ICD-10-CM

## 2024-05-03 DIAGNOSIS — I10 PRIMARY HYPERTENSION: ICD-10-CM

## 2024-05-03 DIAGNOSIS — R42 DIZZINESS: ICD-10-CM

## 2024-05-03 LAB
ALBUMIN SERPL BCP-MCNC: 4.1 G/DL (ref 3.5–5.2)
ALP SERPL-CCNC: 57 U/L (ref 55–135)
ALT SERPL W/O P-5'-P-CCNC: 99 U/L (ref 10–44)
AMYLASE SERPL-CCNC: 76 U/L (ref 20–110)
ANION GAP SERPL CALC-SCNC: 6 MMOL/L (ref 3–11)
AST SERPL-CCNC: 52 U/L (ref 10–40)
BACTERIA #/AREA URNS HPF: NEGATIVE /HPF
BASOPHILS # BLD AUTO: 0.03 K/UL (ref 0–0.2)
BASOPHILS NFR BLD: 0.4 % (ref 0–1.9)
BILIRUB SERPL-MCNC: 0.8 MG/DL (ref 0.1–1)
BILIRUB UR QL STRIP: ABNORMAL
BUN SERPL-MCNC: 17 MG/DL (ref 6–20)
CALCIUM SERPL-MCNC: 9.5 MG/DL (ref 8.7–10.5)
CHLORIDE SERPL-SCNC: 107 MMOL/L (ref 95–110)
CLARITY UR: CLEAR
CO2 SERPL-SCNC: 28 MMOL/L (ref 23–29)
COLOR UR: ABNORMAL
CREAT SERPL-MCNC: 0.9 MG/DL (ref 0.5–1.4)
DIFFERENTIAL METHOD BLD: NORMAL
EOSINOPHIL # BLD AUTO: 0.1 K/UL (ref 0–0.5)
EOSINOPHIL NFR BLD: 1.1 % (ref 0–8)
ERYTHROCYTE [DISTWIDTH] IN BLOOD BY AUTOMATED COUNT: 13.5 % (ref 11.5–14.5)
EST. GFR  (NO RACE VARIABLE): >60 ML/MIN/1.73 M^2
GLUCOSE SERPL-MCNC: 182 MG/DL (ref 70–110)
GLUCOSE UR QL STRIP: NEGATIVE
HCT VFR BLD AUTO: 42.1 % (ref 37–48.5)
HGB BLD-MCNC: 14.2 G/DL (ref 12–16)
HGB UR QL STRIP: NEGATIVE
HYALINE CASTS #/AREA URNS LPF: 2.2 /LPF
IMM GRANULOCYTES # BLD AUTO: 0.02 K/UL (ref 0–0.04)
IMM GRANULOCYTES NFR BLD AUTO: 0.3 % (ref 0–0.5)
KETONES UR QL STRIP: NEGATIVE
LEUKOCYTE ESTERASE UR QL STRIP: ABNORMAL
LIPASE SERPL-CCNC: 29 U/L (ref 13–75)
LYMPHOCYTES # BLD AUTO: 1.9 K/UL (ref 1–4.8)
LYMPHOCYTES NFR BLD: 26.6 % (ref 18–48)
MCH RBC QN AUTO: 29 PG (ref 27–31)
MCHC RBC AUTO-ENTMCNC: 33.7 G/DL (ref 32–36)
MCV RBC AUTO: 86 FL (ref 82–98)
MICROSCOPIC COMMENT: ABNORMAL
MONOCYTES # BLD AUTO: 0.4 K/UL (ref 0.3–1)
MONOCYTES NFR BLD: 6.2 % (ref 4–15)
NEUTROPHILS # BLD AUTO: 4.6 K/UL (ref 1.8–7.7)
NEUTROPHILS NFR BLD: 65.4 % (ref 38–73)
NITRITE UR QL STRIP: POSITIVE
NRBC BLD-RTO: 0 /100 WBC
PH UR STRIP: 7 [PH] (ref 5–8)
PLATELET # BLD AUTO: 186 K/UL (ref 150–450)
PMV BLD AUTO: 11 FL (ref 9.2–12.9)
POTASSIUM SERPL-SCNC: 3.7 MMOL/L (ref 3.5–5.1)
PROT SERPL-MCNC: 7.3 G/DL (ref 6–8.4)
PROT UR QL STRIP: NEGATIVE
RBC # BLD AUTO: 4.9 M/UL (ref 4–5.4)
RBC #/AREA URNS HPF: 3 /HPF (ref 0–4)
SODIUM SERPL-SCNC: 141 MMOL/L (ref 136–145)
SP GR UR STRIP: 1.02 (ref 1–1.03)
SQUAMOUS #/AREA URNS HPF: 1 /HPF
URN SPEC COLLECT METH UR: ABNORMAL
UROBILINOGEN UR STRIP-ACNC: 1 EU/DL
WBC # BLD AUTO: 7.08 K/UL (ref 3.9–12.7)
WBC #/AREA URNS HPF: 86 /HPF (ref 0–5)

## 2024-05-03 PROCEDURE — 87186 SC STD MICRODIL/AGAR DIL: CPT | Performed by: NURSE PRACTITIONER

## 2024-05-03 PROCEDURE — 87086 URINE CULTURE/COLONY COUNT: CPT | Performed by: NURSE PRACTITIONER

## 2024-05-03 PROCEDURE — 85025 COMPLETE CBC W/AUTO DIFF WBC: CPT | Performed by: NURSE PRACTITIONER

## 2024-05-03 PROCEDURE — 80053 COMPREHEN METABOLIC PANEL: CPT | Performed by: NURSE PRACTITIONER

## 2024-05-03 PROCEDURE — 36415 COLL VENOUS BLD VENIPUNCTURE: CPT | Performed by: NURSE PRACTITIONER

## 2024-05-03 PROCEDURE — 87088 URINE BACTERIA CULTURE: CPT | Performed by: NURSE PRACTITIONER

## 2024-05-03 PROCEDURE — 82150 ASSAY OF AMYLASE: CPT | Performed by: NURSE PRACTITIONER

## 2024-05-03 PROCEDURE — 83690 ASSAY OF LIPASE: CPT | Performed by: NURSE PRACTITIONER

## 2024-05-03 PROCEDURE — 87077 CULTURE AEROBIC IDENTIFY: CPT | Performed by: NURSE PRACTITIONER

## 2024-05-03 PROCEDURE — 81000 URINALYSIS NONAUTO W/SCOPE: CPT | Performed by: NURSE PRACTITIONER

## 2024-05-06 NOTE — PROGRESS NOTES
Please let Mrs Barboza know her urine grew Enterococcus. We went her Cefuroxime to [facility] for her to start.

## 2024-05-07 LAB — BACTERIA UR CULT: ABNORMAL

## (undated) DEVICE — DRESSING GAUZE OIL EMUL 3X8

## (undated) DEVICE — SPLINT CAST ROLL 3IN X 15FT

## (undated) DEVICE — TOURNIQUET SB QC DP 24X4IN

## (undated) DEVICE — SUT 4.0 ETHILON

## (undated) DEVICE — POSITIONER PINKPROTECT ARC MED

## (undated) DEVICE — SONIC ANCHOR DRILL

## (undated) DEVICE — SUT J218H VICRYL 4-0

## (undated) DEVICE — UNDERGLOVE BIOGEL PI SZ 6.5 LF

## (undated) DEVICE — BANDAGE ACE DOUBLE STER 6IN

## (undated) DEVICE — DRAPE C-ARM/MOBILE XRAY 44X80

## (undated) DEVICE — APPLICATOR CHLORAPREP ORN 26ML

## (undated) DEVICE — DRAPE MINI C-ARM

## (undated) DEVICE — SONIC ANCHOR TIP

## (undated) DEVICE — LINER MEDI-VAC PPV FLTR 1500CC

## (undated) DEVICE — BANDAGE GAUZE COT STRL 4.5X4.1

## (undated) DEVICE — TRAY MAJOR ORTHO SPILT SURG

## (undated) DEVICE — STRAP KNEE & BODY DISP 4X34IN

## (undated) DEVICE — SUT ETHLN 4-0 CLR PS-2 18

## (undated) DEVICE — CAUTERY PUSHBUTTON PENCIL

## (undated) DEVICE — UNDERGLOVES BIOGEL PI SIZE 7.5

## (undated) DEVICE — HEADREST FOAM DBL SLT HIDENS

## (undated) DEVICE — COVER OVERHEAD SURG LT BLUE

## (undated) DEVICE — ELECTRODE REM PLYHSV RETURN 9

## (undated) DEVICE — GAUZE CNFRM STRTCH STRL 4X75IN

## (undated) DEVICE — SEE MEDLINE ITEM 146322

## (undated) DEVICE — BLANKET UPPER BODY 78.7X29.9IN

## (undated) DEVICE — BLADE SAW SAG CUT EDG 9X31MM

## (undated) DEVICE — SYR 10CC LUER LOCK

## (undated) DEVICE — SPONGE GAUZE 16PLY 4X4

## (undated) DEVICE — UNDERPAD DISPOSABLE 30X30IN

## (undated) DEVICE — LABEL BARKLEY 9/16X1-7/8IN

## (undated) DEVICE — BANDAGE MATRIX HK LOOP 4IN 5YD

## (undated) DEVICE — GLOVE SURGICAL LATEX SZ 6.5

## (undated) DEVICE — GLOVE SURG ULTRA TOUCH 7

## (undated) DEVICE — GLOVE BIOGEL ECLIPSE SZ 7.5

## (undated) DEVICE — BLADE SURG #15 CARBON STEEL

## (undated) DEVICE — PADDING 4X4YD SPECIALIST100

## (undated) DEVICE — SEE MEDLINE ITEM 157171

## (undated) DEVICE — NDL 27G X 1 1/4

## (undated) DEVICE — SPLINT OCL 4

## (undated) DEVICE — DRAPE SURG W/TWL 17 5/8X23

## (undated) DEVICE — DRAPE C-ARM MINI DISP

## (undated) DEVICE — BLADE MEDIUM 9MM X 25MM

## (undated) DEVICE — SUT 3-0 VICRYL / SH (J416)

## (undated) DEVICE — NDL HYPO REG 25G X 1 1/2

## (undated) DEVICE — Device

## (undated) DEVICE — DRESSING CURAD N ADH 3X3IN

## (undated) DEVICE — BANDAGE DERMACEA STRETCH 4X1IN